# Patient Record
Sex: FEMALE | Race: WHITE | NOT HISPANIC OR LATINO | Employment: OTHER | URBAN - METROPOLITAN AREA
[De-identification: names, ages, dates, MRNs, and addresses within clinical notes are randomized per-mention and may not be internally consistent; named-entity substitution may affect disease eponyms.]

---

## 2019-12-01 ENCOUNTER — OFFICE VISIT (OUTPATIENT)
Dept: URGENT CARE | Facility: HOSPITAL | Age: 79
End: 2019-12-01
Payer: MEDICARE

## 2019-12-01 ENCOUNTER — APPOINTMENT (OUTPATIENT)
Dept: RADIOLOGY | Facility: HOSPITAL | Age: 79
End: 2019-12-01
Payer: MEDICARE

## 2019-12-01 VITALS
SYSTOLIC BLOOD PRESSURE: 109 MMHG | TEMPERATURE: 99.4 F | RESPIRATION RATE: 16 BRPM | BODY MASS INDEX: 27.32 KG/M2 | WEIGHT: 170 LBS | OXYGEN SATURATION: 96 % | HEIGHT: 66 IN | HEART RATE: 72 BPM | DIASTOLIC BLOOD PRESSURE: 68 MMHG

## 2019-12-01 DIAGNOSIS — M54.6 ACUTE LEFT-SIDED THORACIC BACK PAIN: ICD-10-CM

## 2019-12-01 DIAGNOSIS — M54.6 ACUTE LEFT-SIDED THORACIC BACK PAIN: Primary | ICD-10-CM

## 2019-12-01 PROCEDURE — 99203 OFFICE O/P NEW LOW 30 MIN: CPT | Performed by: NURSE PRACTITIONER

## 2019-12-01 PROCEDURE — G0463 HOSPITAL OUTPT CLINIC VISIT: HCPCS | Performed by: NURSE PRACTITIONER

## 2019-12-01 PROCEDURE — 71046 X-RAY EXAM CHEST 2 VIEWS: CPT

## 2019-12-01 RX ORDER — ASPIRIN 81 MG/1
81 TABLET, CHEWABLE ORAL DAILY
COMMUNITY

## 2019-12-01 RX ORDER — OXYBUTYNIN CHLORIDE 10 MG/1
10 TABLET, EXTENDED RELEASE ORAL
COMMUNITY
End: 2020-04-09 | Stop reason: SDUPTHER

## 2019-12-01 NOTE — PATIENT INSTRUCTIONS
Can take Tylenol as needed for pain  If he develops any increased pain, shortness of breath, difficulty breathing, chest pain, palpitations, cough, coughing up blood, increased swelling in legs, fever, or any new or concerning symptoms please return or proceed ER  Recommend following up with PCP in 2-3 days  Back Pain   WHAT YOU NEED TO KNOW:   Back pain is common  It can be caused by many conditions, such as arthritis or the breakdown of spinal discs  Your risk for back pain is increased by injuries, lack of activity, or repeated bending and twisting  You may feel sore or stiff on one or both sides of your back  The pain may spread to your buttocks or thighs  DISCHARGE INSTRUCTIONS:   Medicines:   · NSAIDs  help decrease swelling and pain  This medicine is available with or without a doctor's order  NSAIDs can cause stomach bleeding or kidney problems in certain people  If you take blood thinner medicine, always ask your healthcare provider if NSAIDs are safe for you  Always read the medicine label and follow directions  · Acetaminophen  decreases pain  It is available without a doctor's order  Ask how much to take and how often to take it  Follow directions  Acetaminophen can cause liver damage if not taken correctly  · Prescription pain medicine  may be given  Ask your healthcare provider how to take this medicine safely  · Take your medicine as directed  Contact your healthcare provider if you think your medicine is not helping or if you have side effects  Tell him or her if you are allergic to any medicine  Keep a list of the medicines, vitamins, and herbs you take  Include the amounts, and when and why you take them  Bring the list or the pill bottles to follow-up visits  Carry your medicine list with you in case of an emergency  Follow up with your healthcare provider in 2 weeks, or as directed:  Write down your questions so you remember to ask them during your visits    How to manage your back pain:   · Apply ice  on your back or affected area for 15 to 20 minutes every hour or as directed  Use an ice pack, or put crushed ice in a plastic bag  Cover it with a towel  Ice helps prevent tissue damage and decreases pain  · Apply heat  on your back or affected area for 20 to 30 minutes every 2 hours for as many days as directed  Heat helps decrease pain and muscle spasms  · Stay active  as much as you can without causing more pain  Bed rest could make your back pain worse  Avoid heavy lifting until your pain is gone  Return to the emergency department if:   · You have pain, numbness, or weakness in one or both legs  · Your pain becomes so severe that you cannot walk  · You cannot control your urine or bowel movements  · You have severe back pain with chest pain  · You have severe back pain, nausea, and vomiting  · You have severe back pain that spreads to your side or genital area  Contact your healthcare provider if:   · You have back pain that does not get better with rest and pain medicine  · You have a fever  · You have pain that worsens when you are on your back or when you rest     · You have pain that worsens when you cough or sneeze  · You lose weight without trying  · You have questions or concerns about your condition or care  © 2017 [address] Information is for End User's use only and may not be sold, redistributed or otherwise used for commercial purposes  All illustrations and images included in CareNotes® are the copyrighted property of [employer]  or Andrea Singh  The above information is an  only  It is not intended as medical advice for individual conditions or treatments  Talk to your doctor, nurse or pharmacist before following any medical regimen to see if it is safe and effective for you

## 2019-12-01 NOTE — PROGRESS NOTES
[facility]        NAME: Kory Patel is a 67 y o  female  : 1940    MRN: [id number]  DATE: 2019  TIME: 2:03 PM    Assessment and Plan   Acute left-sided thoracic back pain [M54 6]  1  Acute left-sided thoracic back pain  XR chest pa & lateral    CANCELED: POCT urine dip     No acute cardiopulmonary disease on x-ray per my read  Patient's daughter is requesting urine sample, however patient missed the hat and  was unable to provide additional sample  Patient is currently asymptomatic, advised the patient and daughter if she develops any flank pain, abdominal pain, vomiting, urinary symptoms, fever, confusion, any concerning symptoms to please return or proceed ER  Verbalizes understanding  Patient Instructions     Patient Instructions   Can take Tylenol as needed for pain  If you develop any increased pain, shortness of breath, difficulty breathing, chest pain, palpitations, cough, coughing up blood, increased swelling in legs, fever, or any new or concerning symptoms please return or proceed ER  Recommend following up with PCP in 2-3 days  Back Pain   WHAT YOU NEED TO KNOW:   Back pain is common  It can be caused by many conditions, such as arthritis or the breakdown of spinal discs  Your risk for back pain is increased by injuries, lack of activity, or repeated bending and twisting  You may feel sore or stiff on one or both sides of your back  The pain may spread to your buttocks or thighs  DISCHARGE INSTRUCTIONS:   Medicines:   · NSAIDs  help decrease swelling and pain  This medicine is available with or without a doctor's order  NSAIDs can cause stomach bleeding or kidney problems in certain people  If you take blood thinner medicine, always ask your healthcare provider if NSAIDs are safe for you  Always read the medicine label and follow directions  · Acetaminophen  decreases pain  It is available without a doctor's order   Ask how much to take and how often to take it  Follow directions  Acetaminophen can cause liver damage if not taken correctly  · Prescription pain medicine  may be given  Ask your healthcare provider how to take this medicine safely  · Take your medicine as directed  Contact your healthcare provider if you think your medicine is not helping or if you have side effects  Tell him or her if you are allergic to any medicine  Keep a list of the medicines, vitamins, and herbs you take  Include the amounts, and when and why you take them  Bring the list or the pill bottles to follow-up visits  Carry your medicine list with you in case of an emergency  Follow up with your healthcare provider in 2 weeks, or as directed:  Write down your questions so you remember to ask them during your visits  How to manage your back pain:   · Apply ice  on your back or affected area for 15 to 20 minutes every hour or as directed  Use an ice pack, or put crushed ice in a plastic bag  Cover it with a towel  Ice helps prevent tissue damage and decreases pain  · Apply heat  on your back or affected area for 20 to 30 minutes every 2 hours for as many days as directed  Heat helps decrease pain and muscle spasms  · Stay active  as much as you can without causing more pain  Bed rest could make your back pain worse  Avoid heavy lifting until your pain is gone  Return to the emergency department if:   · You have pain, numbness, or weakness in one or both legs  · Your pain becomes so severe that you cannot walk  · You cannot control your urine or bowel movements  · You have severe back pain with chest pain  · You have severe back pain, nausea, and vomiting  · You have severe back pain that spreads to your side or genital area  Contact your healthcare provider if:   · You have back pain that does not get better with rest and pain medicine  · You have a fever      · You have pain that worsens when you are on your back or when you rest     · You have pain that worsens when you cough or sneeze  · You lose weight without trying  · You have questions or concerns about your condition or care  © 2017 [address]  Information is for End User's use only and may not be sold, redistributed or otherwise used for commercial purposes  All illustrations and images included in CareNotes® are the copyrighted property of [employer]  or Andrea Singh  The above information is an  only  It is not intended as medical advice for individual conditions or treatments  Talk to your doctor, nurse or pharmacist before following any medical regimen to see if it is safe and effective for you  Follow up with PCP in 3-5 days  Proceed to  ER if symptoms worsen  Chief Complaint     Chief Complaint   Patient presents with    Back Pain     Mid to lower on the left side, started about three days ago  Urinating normally  History of Present Illness       Patient is a 73-year-old female who presents with a 3 day history of left sided thoracic back pain  Patient states the pain is only present while lying flat at night  Patient is currently asymptomatic  Patient denies any chest pain, shortness of breath, palpitations, hemoptysis or cough  Denies any pain with breathing  Denies any pain with movement or currently at rest   Patient denies any increased leg swelling  Patient denies any history of PE or DVT  Patient denies any urinary complaints, flank pain, abdominal pain, nausea, vomiting or diarrhea  Patient denies any fever, chills, or body aches  Patient denies any URI symptoms  Patient is currently wheelchair-bound but denies any increased weakness  Patient's daughter denies any confusion or change in mental status  Denies any headache, dizziness or feeling lightheaded  Denies any numbness, tingling, decreased sensation in extremities  Has not tried any over-the-counter medication         Review of Systems   Review of Systems   Constitutional: Negative for chills, diaphoresis, fatigue and fever  HENT: Negative for congestion, ear pain, facial swelling, mouth sores, postnasal drip, rhinorrhea, sinus pressure, sinus pain, sore throat and trouble swallowing  Eyes: Negative for photophobia, pain, discharge, redness, itching and visual disturbance  Respiratory: Negative for cough, chest tightness, shortness of breath, wheezing and stridor  Cardiovascular: Negative for chest pain, palpitations and leg swelling  Gastrointestinal: Negative for abdominal pain, constipation, diarrhea, nausea and vomiting  Genitourinary: Negative for decreased urine volume, difficulty urinating, dysuria, flank pain, frequency, hematuria, pelvic pain, urgency, vaginal bleeding and vaginal discharge  Musculoskeletal: Positive for back pain  Negative for arthralgias, joint swelling, myalgias, neck pain and neck stiffness  Skin: Negative for rash  Neurological: Negative for dizziness, tremors, seizures, syncope, facial asymmetry, speech difficulty, weakness, light-headedness, numbness and headaches           Current Medications       Current Outpatient Medications:     aspirin 81 mg chewable tablet, Chew 81 mg daily, Disp: , Rfl:     oxybutynin (DITROPAN-XL) 10 MG 24 hr tablet, Take 10 mg by mouth daily at bedtime, Disp: , Rfl:     Current Allergies     Allergies as of 12/01/2019 - Reviewed 12/01/2019   Allergen Reaction Noted    Novocain [procaine]  12/01/2019    Penicillins  12/01/2019    Sulfa antibiotics  12/01/2019            The following portions of the patient's history were reviewed and updated as appropriate: allergies, current medications, past family history, past medical history, past social history, past surgical history and problem list      Past Medical History:   Diagnosis Date    Supranuclear palsy ([address] )        Past Surgical History:   Procedure Laterality Date    BACK SURGERY      HIP FRACTURE SURGERY         No family history on file  Medications have been verified  Objective   /68 (BP Location: Left arm, Patient Position: Sitting, Cuff Size: Standard)   Pulse 72   Temp 99 4 °F (37 4 °C) (Temporal)   Resp 16   Ht 5' 5 5" (1 664 m)   Wt 77 1 kg (170 lb)   SpO2 96%   BMI 27 86 kg/m²        Physical Exam     Physical Exam   Constitutional: She is oriented to person, place, and time  She appears well-developed and well-nourished  No distress  HENT:   Head: Normocephalic and atraumatic  Neck: Normal range of motion  Neck supple  No spinous process tenderness and no muscular tenderness present  Normal range of motion present  Cardiovascular: Normal rate, regular rhythm, S1 normal, S2 normal, normal heart sounds, intact distal pulses and normal pulses  +1 pitting edema in BLE, chronic per patient and daughter  Pulmonary/Chest: Effort normal and breath sounds normal  No stridor  She has no decreased breath sounds  She exhibits no mass, no tenderness, no bony tenderness, no crepitus, no edema, no deformity and no retraction  Abdominal: Soft  Normal appearance and bowel sounds are normal  She exhibits no distension and no mass  There is no tenderness  There is no rigidity, no rebound, no guarding, no CVA tenderness, no tenderness at McBurney's point and negative Azar's sign  Musculoskeletal:        Cervical back: Normal         Thoracic back: Normal  She exhibits no tenderness, no bony tenderness, no swelling, no edema, no deformity, no pain and no spasm  Lumbar back: Normal    Neurological: She is alert and oriented to person, place, and time  GCS eye subscore is 4  GCS verbal subscore is 5  GCS motor subscore is 6  Skin: Skin is warm and dry  Capillary refill takes less than 2 seconds  She is not diaphoretic

## 2019-12-16 ENCOUNTER — OFFICE VISIT (OUTPATIENT)
Dept: FAMILY MEDICINE CLINIC | Facility: CLINIC | Age: 79
End: 2019-12-16
Payer: MEDICARE

## 2019-12-16 VITALS
HEART RATE: 74 BPM | BODY MASS INDEX: 27.97 KG/M2 | DIASTOLIC BLOOD PRESSURE: 66 MMHG | WEIGHT: 174 LBS | SYSTOLIC BLOOD PRESSURE: 112 MMHG | TEMPERATURE: 98.1 F | HEIGHT: 66 IN | OXYGEN SATURATION: 98 % | RESPIRATION RATE: 18 BRPM

## 2019-12-16 DIAGNOSIS — Z11.1 VISIT FOR TB SKIN TEST: ICD-10-CM

## 2019-12-16 DIAGNOSIS — Z00.00 ENCOUNTER FOR MEDICARE ANNUAL WELLNESS EXAM: ICD-10-CM

## 2019-12-16 DIAGNOSIS — I63.89 CEREBROVASCULAR ACCIDENT (CVA) DUE TO OTHER MECHANISM (HCC): ICD-10-CM

## 2019-12-16 DIAGNOSIS — Z13.220 SCREENING CHOLESTEROL LEVEL: ICD-10-CM

## 2019-12-16 DIAGNOSIS — G23.1 SUPRANUCLEAR PALSY (HCC): Primary | ICD-10-CM

## 2019-12-16 PROBLEM — I63.9 CEREBROVASCULAR ACCIDENT (HCC): Status: ACTIVE | Noted: 2019-12-16

## 2019-12-16 PROCEDURE — G0439 PPPS, SUBSEQ VISIT: HCPCS | Performed by: NURSE PRACTITIONER

## 2019-12-16 PROCEDURE — 99214 OFFICE O/P EST MOD 30 MIN: CPT | Performed by: NURSE PRACTITIONER

## 2019-12-16 NOTE — PROGRESS NOTES
[facility]        NAME: David Mistry is a 67 y o  female  : 1940    MRN: [id number]  DATE: 2019  TIME: 4:43 PM    Assessment and Plan   Supranuclear palsy ([address] ) [G23 1]  1  Supranuclear palsy ([address] )  Comprehensive metabolic panel   2  Visit for TB skin test  Quantiferon TB Gold Plus   3  Cerebrovascular accident (CVA) due to other mechanism [facility])  Comprehensive metabolic panel   4  Screening cholesterol level  Lipid panel   5  Encounter for Medicare annual wellness exam           Patient Instructions     Patient Instructions     Recommend OT/PT consult  Highly encourage another neurology consult for decreasing neurological functionability  Form filled out for Adult Day services  Labs ordered  Call or return for problems/concerns      Medicare Preventive Visit Patient Instructions  Thank you for completing your Welcome to Medicare Visit or Medicare Annual Wellness Visit today  Your next wellness visit will be due in one year (2020)  The screening/preventive services that you may require over the next 5-10 years are detailed below  Some tests may not apply to you based off risk factors and/or age  Screening tests ordered at today's visit but not completed yet may show as past due  Also, please note that scanned in results may not display below  Preventive Screenings:  Service Recommendations Previous Testing/Comments   Colorectal Cancer Screening  * Colonoscopy    * Fecal Occult Blood Test (FOBT)/Fecal Immunochemical Test (FIT)  * Fecal DNA/Cologuard Test  * Flexible Sigmoidoscopy Age: 54-65 years old   Colonoscopy: every 10 years (may be performed more frequently if at higher risk)  OR  FOBT/FIT: every 1 year  OR  Cologuard: every 3 years  OR  Sigmoidoscopy: every 5 years  Screening may be recommended earlier than age 48 if at higher risk for colorectal cancer   Also, an individualized decision between you and your healthcare provider will decide whether screening between the ages of 74-80 would be appropriate  Colonoscopy: Not on file  FOBT/FIT: Not on file  Cologuard: Not on file  Sigmoidoscopy: Not on file         Breast Cancer Screening Age: 36 years old  Frequency: every 1-2 years  Not required if history of left and right mastectomy Mammogram: Not on file       Cervical Cancer Screening Between the ages of 21-29, pap smear recommended once every 3 years  Between the ages of 33-67, can perform pap smear with HPV co-testing every 5 years  Recommendations may differ for women with a history of total hysterectomy, cervical cancer, or abnormal pap smears in past  Pap Smear: Not on file    Screening Not Indicated   Hepatitis C Screening Once for adults born between 1945 and 1965  More frequently in patients at high risk for Hepatitis C Hep C Antibody: Not on file       Diabetes Screening 1-2 times per year if you're at risk for diabetes or have pre-diabetes Fasting glucose: No results in last 5 years   A1C: No results in last 5 years       Cholesterol Screening Once every 5 years if you don't have a lipid disorder  May order more often based on risk factors  Lipid panel: Not on file         Other Preventive Screenings Covered by Medicare:  1  Abdominal Aortic Aneurysm (AAA) Screening: covered once if your at risk  You're considered to be at risk if you have a family history of AAA  2  Lung Cancer Screening: covers low dose CT scan once per year if you meet all of the following conditions: (1) Age 50-69; (2) No signs or symptoms of lung cancer; (3) Current smoker or have quit smoking within the last 15 years; (4) You have a tobacco smoking history of at least 30 pack years (packs per day multiplied by number of years you smoked); (5) You get a written order from a healthcare provider    3  Glaucoma Screening: covered annually if you're considered high risk: (1) You have diabetes OR (2) Family history of glaucoma OR (3)  aged 48 and older OR (4)  American aged 72 and older  3  Osteoporosis Screening: covered every 2 years if you meet one of the following conditions: (1) You're estrogen deficient and at risk for osteoporosis based off medical history and other findings; (2) Have a vertebral abnormality; (3) On glucocorticoid therapy for more than 3 months; (4) Have primary hyperparathyroidism; (5) On osteoporosis medications and need to assess response to drug therapy  · Last bone density test (DXA Scan): Not on file  5  HIV Screening: covered annually if you're between the age of 12-76  Also covered annually if you are younger than 13 and older than 72 with risk factors for HIV infection  For pregnant patients, it is covered up to 3 times per pregnancy  Immunizations:  Immunization Recommendations   Influenza Vaccine Annual influenza vaccination during flu season is recommended for all persons aged >= 6 months who do not have contraindications   Pneumococcal Vaccine (Prevnar and Pneumovax)  * Prevnar = PCV13  * Pneumovax = PPSV23   Adults 25-60 years old: 1-3 doses may be recommended based on certain risk factors  Adults 72 years old: Prevnar (PCV13) vaccine recommended followed by Pneumovax (PPSV23) vaccine  If already received PPSV23 since turning 65, then PCV13 recommended at least one year after PPSV23 dose  Hepatitis B Vaccine 3 dose series if at intermediate or high risk (ex: diabetes, end stage renal disease, liver disease)   Tetanus (Td) Vaccine - COST NOT COVERED BY MEDICARE PART B Following completion of primary series, a booster dose should be given every 10 years to maintain immunity against tetanus  Td may also be given as tetanus wound prophylaxis  Tdap Vaccine - COST NOT COVERED BY MEDICARE PART B Recommended at least once for all adults  For pregnant patients, recommended with each pregnancy     Shingles Vaccine (Shingrix) - COST NOT COVERED BY MEDICARE PART B  2 shot series recommended in those aged 48 and above     Health Maintenance Due:  There are no preventive care reminders to display for this patient  Immunizations Due:      Topic Date Due    DTaP,Tdap,and Td Vaccines (1 - Tdap) 07/12/1951    Influenza Vaccine  07/01/2019     Advance Directives   What are advance directives? Advance directives are legal documents that state your wishes and plans for medical care  These plans are made ahead of time in case you lose your ability to make decisions for yourself  Advance directives can apply to any medical decision, such as the treatments you want, and if you want to donate organs  What are the types of advance directives? There are many types of advance directives, and each state has rules about how to use them  You may choose a combination of any of the following:  · Living will: This is a written record of the treatment you want  You can also choose which treatments you do not want, which to limit, and which to stop at a certain time  This includes surgery, medicine, IV fluid, and tube feedings  · Durable power of  for healthcare [facility]): This is a written record that states who you want to make healthcare choices for you when you are unable to make them for yourself  This person, called a proxy, is usually a family member or a friend  You may choose more than 1 proxy  · Do not resuscitate (DNR) order:  A DNR order is used in case your heart stops beating or you stop breathing  It is a request not to have certain forms of treatment, such as CPR  A DNR order may be included in other types of advance directives  · Medical directive: This covers the care that you want if you are in a coma, near death, or unable to make decisions for yourself  You can list the treatments you want for each condition  Treatment may include pain medicine, surgery, blood transfusions, dialysis, IV or tube feedings, and a ventilator (breathing machine)  · Values history:   This document has questions about your views, beliefs, and how you feel and think about life  This information can help others choose the care that you would choose  Why are advance directives important? An advance directive helps you control your care  Although spoken wishes may be used, it is better to have your wishes written down  Spoken wishes can be misunderstood, or not followed  Treatments may be given even if you do not want them  An advance directive may make it easier for your family to make difficult choices about your care  Fall Prevention    Fall prevention  includes ways to make your home and other areas safer  It also includes ways you can move more carefully to prevent a fall  Health conditions that cause changes in your blood pressure, vision, or muscle strength and coordination may increase your risk for falls  Medicines may also increase your risk for falls if they make you dizzy, weak, or sleepy  Fall prevention tips:   · Stand or sit up slowly  · Use assistive devices as directed  · Wear shoes that fit well and have soles that   · Wear a personal alarm  · Stay active  · Manage your medical conditions  Home Safety Tips:  · Add items to prevent falls in the bathroom  · Keep paths clear  · Install bright lights in your home  · Keep items you use often on shelves within reach  · Paint or place reflective tape on the edges of your stairs  Urinary Incontinence   Urinary incontinence (UI)  is when you lose control of your bladder  UI develops because your bladder cannot store or empty urine properly  The 3 most common types of UI are stress incontinence, urge incontinence, or both  Medicines:   · May be given to help strengthen your bladder control  Report any side effects of medication to your healthcare provider  Do pelvic muscle exercises often:  Your pelvic muscles help you stop urinating  Squeeze these muscles tight for 5 seconds, then relax for 5 seconds  Gradually work up to squeezing for 10 seconds   Do 3 sets of 15 repetitions a day, or as directed  This will help strengthen your pelvic muscles and improve bladder control  Train your bladder:  Go to the bathroom at set times, such as every 2 hours, even if you do not feel the urge to go  You can also try to hold your urine when you feel the urge to go  For example, hold your urine for 5 minutes when you feel the urge to go  As that becomes easier, hold your urine for 10 minutes  Self-care:   · Keep a UI record  Write down how often you leak urine and how much you leak  Make a note of what you were doing when you leaked urine  · Drink liquids as directed  You may need to limit the amount of liquid you drink to help control your urine leakage  Do not drink any liquid right before you go to bed  Limit or do not have drinks that contain caffeine or alcohol  · Prevent constipation  Eat a variety of high-fiber foods  Good examples are high-fiber cereals, beans, vegetables, and whole-grain breads  Walking is the best way to trigger your intestines to have a bowel movement  · Exercise regularly and maintain a healthy weight  Weight loss and exercise will decrease pressure on your bladder and help you control your leakage  · Use a catheter as directed  to help empty your bladder  A catheter is a tiny, plastic tube that is put into your bladder to drain your urine  · Go to behavior therapy as directed  Behavior therapy may be used to help you learn to control your urge to urinate  Weight Management   Why it is important to manage your weight:  Being overweight increases your risk of health conditions such as heart disease, high blood pressure, type 2 diabetes, and certain types of cancer  It can also increase your risk for osteoarthritis, sleep apnea, and other respiratory problems  Aim for a slow, steady weight loss  Even a small amount of weight loss can lower your risk of health problems    How to lose weight safely:  A safe and healthy way to lose weight is to eat fewer calories and get regular exercise  You can lose up about 1 pound a week by decreasing the number of calories you eat by 500 calories each day  Healthy meal plan for weight management:  A healthy meal plan includes a variety of foods, contains fewer calories, and helps you stay healthy  A healthy meal plan includes the following:  · Eat whole-grain foods more often  A healthy meal plan should contain fiber  Fiber is the part of grains, fruits, and vegetables that is not broken down by your body  Whole-grain foods are healthy and provide extra fiber in your diet  Some examples of whole-grain foods are whole-wheat breads and pastas, oatmeal, brown rice, and bulgur  · Eat a variety of vegetables every day  Include dark, leafy greens such as spinach, kale, elizabeth greens, and mustard greens  Eat yellow and orange vegetables such as carrots, sweet potatoes, and winter squash  · Eat a variety of fruits every day  Choose fresh or canned fruit (canned in its own juice or light syrup) instead of juice  Fruit juice has very little or no fiber  · Eat low-fat dairy foods  Drink fat-free (skim) milk or 1% milk  Eat fat-free yogurt and low-fat cottage cheese  Try low-fat cheeses such as mozzarella and other reduced-fat cheeses  · Choose meat and other protein foods that are low in fat  Choose beans or other legumes such as split peas or lentils  Choose fish, skinless poultry (chicken or turkey), or lean cuts of red meat (beef or pork)  Before you cook meat or poultry, cut off any visible fat  · Use less fat and oil  Try baking foods instead of frying them  Add less fat, such as margarine, sour cream, regular salad dressing and mayonnaise to foods  Eat fewer high-fat foods  Some examples of high-fat foods include french fries, doughnuts, ice cream, and cakes  · Eat fewer sweets  Limit foods and drinks that are high in sugar  This includes candy, cookies, regular soda, and sweetened drinks    Exercise: Exercise at least 30 minutes per day on most days of the week  Some examples of exercise include walking, biking, dancing, and swimming  You can also fit in more physical activity by taking the stairs instead of the elevator or parking farther away from stores  Ask your healthcare provider about the best exercise plan for you  © Copyright [employer] 2018 Information is for End User's use only and may not be sold, redistributed or otherwise used for commercial purposes  All illustrations and images included in CareNotes® are the copyrighted property of [employer]  or [address] [place] [address]            Chief Complaint     Chief Complaint   Patient presents with   [address] Medicare Wellness Visit         History of Present Illness       Pt  Presents to establish care- her 2 daughters take care of her in their homes- is 100% total care- is wheelchair bound- is losing muscle and strength in her neck and back  Was diagnosed with a "an old CVA" and supranuclear palsy by a neurologist- she has paperwork today at visit to be accepted into the [facility] Day program in [employer]  Pt  Is incontinent and has trouble feeding self  She is alert and oriented x 3 at times but then disoriented  Has some memory loss but does remember dates when asked  Daughter Brandi Osborne) is requesting that a  call her directly to find out what in home services would be covered (physical/occupational therapies)  Review of Systems   Review of Systems   Constitutional: Positive for activity change  Negative for appetite change, chills, diaphoresis and fatigue  HENT: Negative for congestion, rhinorrhea, sinus pressure, sinus pain, sore throat and trouble swallowing  Respiratory: Negative for cough, choking and wheezing  Cardiovascular: Positive for leg swelling (left leg has always had worse swelling than the right leg per daughter, )  Negative for chest pain     Gastrointestinal: Positive for constipation (daughter has been giving prunes)  Negative for abdominal distention  Genitourinary: Positive for difficulty urinating (incontinent)  Negative for decreased urine volume, dysuria, flank pain, hematuria and urgency  Musculoskeletal: Positive for gait problem and neck pain (neck "hangs" on chests per daughter)  Negative for neck stiffness  Skin: Negative for color change, rash and wound  Neurological: Positive for speech difficulty and weakness  Negative for dizziness (described as "off balance"), seizures and headaches  Psychiatric/Behavioral: Positive for confusion  Negative for agitation, behavioral problems, decreased concentration, dysphoric mood, hallucinations, self-injury, sleep disturbance and suicidal ideas  The patient is not nervous/anxious and is not hyperactive  Current Medications       Current Outpatient Medications:     aspirin 81 mg chewable tablet, Chew 81 mg daily, Disp: , Rfl:     oxybutynin (DITROPAN-XL) 10 MG 24 hr tablet, Take 10 mg by mouth daily at bedtime, Disp: , Rfl:     Current Allergies     Allergies as of 12/16/2019 - Reviewed 12/16/2019   Allergen Reaction Noted    Procaine Anaphylaxis and Other (See Comments) 10/19/2011    Penicillins Other (See Comments) 11/02/2007    Sulfa antibiotics  11/07/2016    Sulfacetamide  11/02/2007            The following portions of the patient's history were reviewed and updated as appropriate: allergies, current medications, past family history, past medical history, past social history, past surgical history and problem list      Past Medical History:   Diagnosis Date    Supranuclear palsy ([address] )        Past Surgical History:   Procedure Laterality Date    BACK SURGERY      HIP FRACTURE SURGERY         History reviewed  No pertinent family history  Medications have been verified          Objective   /66   Pulse 74   Temp 98 1 °F (36 7 °C)   Resp 18   Ht 5' 5 5" (1 664 m)   Wt 78 9 kg (174 lb)   SpO2 98% BMI 28 51 kg/m²        Physical Exam     Physical Exam   Constitutional: She appears well-developed and well-nourished  No distress  HENT:   Head: Normocephalic and atraumatic  Right Ear: Tympanic membrane, external ear and ear canal normal    Left Ear: Tympanic membrane, external ear and ear canal normal    Nose: Nose normal    Mouth/Throat: Uvula is midline, oropharynx is clear and moist and mucous membranes are normal    Eyes: Pupils are equal, round, and reactive to light  Conjunctivae and EOM are normal  Right eye exhibits no discharge  Left eye exhibits no discharge  Neck: Normal range of motion  Cardiovascular: Normal rate, regular rhythm and normal heart sounds  Exam reveals no gallop and no friction rub  No murmur heard  Pulmonary/Chest: Effort normal and breath sounds normal  No respiratory distress  She has no wheezes  She has no rales  Abdominal: Soft  Bowel sounds are normal  She exhibits no distension and no mass  There is no tenderness  Musculoskeletal: Normal range of motion  She exhibits no edema, tenderness or deformity  Neurological: She is alert  She is disoriented  She displays atrophy (weakness noted in LUE but had some strength, less than RUE)  No cranial nerve deficit  Coordination normal    Pt  Allowed head to hang onto chest- would lift it when asked  Eyes were slow to follow objects and focus  Speech slurred  Right hand appeared flaccid and contracted until asked pt  Was able to shake my hand  Skin: Skin is warm and dry  No rash noted  She is not diaphoretic  No erythema  Psychiatric: Her behavior is normal  Judgment and thought content normal  Her mood appears not anxious  Her speech is slurred  Cognition and memory are impaired  She exhibits a depressed mood (flat affect, little emotion shown)  Nursing note and vitals reviewed

## 2019-12-16 NOTE — PATIENT INSTRUCTIONS
Recommend OT/PT consult  Highly encourage another neurology consult for decreasing neurological functionability  Form filled out for Adult Day services  Labs ordered  Call or return for problems/concerns      Medicare Preventive Visit Patient Instructions  Thank you for completing your Welcome to Medicare Visit or Medicare Annual Wellness Visit today  Your next wellness visit will be due in one year (12/16/2020)  The screening/preventive services that you may require over the next 5-10 years are detailed below  Some tests may not apply to you based off risk factors and/or age  Screening tests ordered at today's visit but not completed yet may show as past due  Also, please note that scanned in results may not display below  Preventive Screenings:  Service Recommendations Previous Testing/Comments   Colorectal Cancer Screening  * Colonoscopy    * Fecal Occult Blood Test (FOBT)/Fecal Immunochemical Test (FIT)  * Fecal DNA/Cologuard Test  * Flexible Sigmoidoscopy Age: 54-65 years old   Colonoscopy: every 10 years (may be performed more frequently if at higher risk)  OR  FOBT/FIT: every 1 year  OR  Cologuard: every 3 years  OR  Sigmoidoscopy: every 5 years  Screening may be recommended earlier than age 48 if at higher risk for colorectal cancer  Also, an individualized decision between you and your healthcare provider will decide whether screening between the ages of 74-80 would be appropriate  Colonoscopy: Not on file  FOBT/FIT: Not on file  Cologuard: Not on file  Sigmoidoscopy: Not on file         Breast Cancer Screening Age: 36 years old  Frequency: every 1-2 years  Not required if history of left and right mastectomy Mammogram: Not on file       Cervical Cancer Screening Between the ages of 21-29, pap smear recommended once every 3 years  Between the ages of 33-67, can perform pap smear with HPV co-testing every 5 years     Recommendations may differ for women with a history of total hysterectomy, cervical cancer, or abnormal pap smears in past  Pap Smear: Not on file    Screening Not Indicated   Hepatitis C Screening Once for adults born between 1945 and 1965  More frequently in patients at high risk for Hepatitis C Hep C Antibody: Not on file       Diabetes Screening 1-2 times per year if you're at risk for diabetes or have pre-diabetes Fasting glucose: No results in last 5 years   A1C: No results in last 5 years       Cholesterol Screening Once every 5 years if you don't have a lipid disorder  May order more often based on risk factors  Lipid panel: Not on file         Other Preventive Screenings Covered by Medicare:  1  Abdominal Aortic Aneurysm (AAA) Screening: covered once if your at risk  You're considered to be at risk if you have a family history of AAA  2  Lung Cancer Screening: covers low dose CT scan once per year if you meet all of the following conditions: (1) Age 50-69; (2) No signs or symptoms of lung cancer; (3) Current smoker or have quit smoking within the last 15 years; (4) You have a tobacco smoking history of at least 30 pack years (packs per day multiplied by number of years you smoked); (5) You get a written order from a healthcare provider  3  Glaucoma Screening: covered annually if you're considered high risk: (1) You have diabetes OR (2) Family history of glaucoma OR (3)  aged 48 and older OR (3)  American aged 72 and older  3  Osteoporosis Screening: covered every 2 years if you meet one of the following conditions: (1) You're estrogen deficient and at risk for osteoporosis based off medical history and other findings; (2) Have a vertebral abnormality; (3) On glucocorticoid therapy for more than 3 months; (4) Have primary hyperparathyroidism; (5) On osteoporosis medications and need to assess response to drug therapy  · Last bone density test (DXA Scan): Not on file  5  HIV Screening: covered annually if you're between the age of 12-76   Also covered annually if you are younger than 13 and older than 72 with risk factors for HIV infection  For pregnant patients, it is covered up to 3 times per pregnancy  Immunizations:  Immunization Recommendations   Influenza Vaccine Annual influenza vaccination during flu season is recommended for all persons aged >= 6 months who do not have contraindications   Pneumococcal Vaccine (Prevnar and Pneumovax)  * Prevnar = PCV13  * Pneumovax = PPSV23   Adults 25-60 years old: 1-3 doses may be recommended based on certain risk factors  Adults 72 years old: Prevnar (PCV13) vaccine recommended followed by Pneumovax (PPSV23) vaccine  If already received PPSV23 since turning 65, then PCV13 recommended at least one year after PPSV23 dose  Hepatitis B Vaccine 3 dose series if at intermediate or high risk (ex: diabetes, end stage renal disease, liver disease)   Tetanus (Td) Vaccine - COST NOT COVERED BY MEDICARE PART B Following completion of primary series, a booster dose should be given every 10 years to maintain immunity against tetanus  Td may also be given as tetanus wound prophylaxis  Tdap Vaccine - COST NOT COVERED BY MEDICARE PART B Recommended at least once for all adults  For pregnant patients, recommended with each pregnancy  Shingles Vaccine (Shingrix) - COST NOT COVERED BY MEDICARE PART B  2 shot series recommended in those aged 48 and above     Health Maintenance Due:  There are no preventive care reminders to display for this patient  Immunizations Due:      Topic Date Due    DTaP,Tdap,and Td Vaccines (1 - Tdap) 07/12/1951    Influenza Vaccine  07/01/2019     Advance Directives   What are advance directives? Advance directives are legal documents that state your wishes and plans for medical care  These plans are made ahead of time in case you lose your ability to make decisions for yourself  Advance directives can apply to any medical decision, such as the treatments you want, and if you want to donate organs     What are the types of advance directives? There are many types of advance directives, and each state has rules about how to use them  You may choose a combination of any of the following:  · Living will: This is a written record of the treatment you want  You can also choose which treatments you do not want, which to limit, and which to stop at a certain time  This includes surgery, medicine, IV fluid, and tube feedings  · Durable power of  for healthcare [facility]): This is a written record that states who you want to make healthcare choices for you when you are unable to make them for yourself  This person, called a proxy, is usually a family member or a friend  You may choose more than 1 proxy  · Do not resuscitate (DNR) order:  A DNR order is used in case your heart stops beating or you stop breathing  It is a request not to have certain forms of treatment, such as CPR  A DNR order may be included in other types of advance directives  · Medical directive: This covers the care that you want if you are in a coma, near death, or unable to make decisions for yourself  You can list the treatments you want for each condition  Treatment may include pain medicine, surgery, blood transfusions, dialysis, IV or tube feedings, and a ventilator (breathing machine)  · Values history: This document has questions about your views, beliefs, and how you feel and think about life  This information can help others choose the care that you would choose  Why are advance directives important? An advance directive helps you control your care  Although spoken wishes may be used, it is better to have your wishes written down  Spoken wishes can be misunderstood, or not followed  Treatments may be given even if you do not want them  An advance directive may make it easier for your family to make difficult choices about your care  Fall Prevention    Fall prevention  includes ways to make your home and other areas safer   It also includes ways you can move more carefully to prevent a fall  Health conditions that cause changes in your blood pressure, vision, or muscle strength and coordination may increase your risk for falls  Medicines may also increase your risk for falls if they make you dizzy, weak, or sleepy  Fall prevention tips:   · Stand or sit up slowly  · Use assistive devices as directed  · Wear shoes that fit well and have soles that   · Wear a personal alarm  · Stay active  · Manage your medical conditions  Home Safety Tips:  · Add items to prevent falls in the bathroom  · Keep paths clear  · Install bright lights in your home  · Keep items you use often on shelves within reach  · Paint or place reflective tape on the edges of your stairs  Urinary Incontinence   Urinary incontinence (UI)  is when you lose control of your bladder  UI develops because your bladder cannot store or empty urine properly  The 3 most common types of UI are stress incontinence, urge incontinence, or both  Medicines:   · May be given to help strengthen your bladder control  Report any side effects of medication to your healthcare provider  Do pelvic muscle exercises often:  Your pelvic muscles help you stop urinating  Squeeze these muscles tight for 5 seconds, then relax for 5 seconds  Gradually work up to squeezing for 10 seconds  Do 3 sets of 15 repetitions a day, or as directed  This will help strengthen your pelvic muscles and improve bladder control  Train your bladder:  Go to the bathroom at set times, such as every 2 hours, even if you do not feel the urge to go  You can also try to hold your urine when you feel the urge to go  For example, hold your urine for 5 minutes when you feel the urge to go  As that becomes easier, hold your urine for 10 minutes  Self-care:   · Keep a UI record  Write down how often you leak urine and how much you leak   Make a note of what you were doing when you leaked urine   · Drink liquids as directed  You may need to limit the amount of liquid you drink to help control your urine leakage  Do not drink any liquid right before you go to bed  Limit or do not have drinks that contain caffeine or alcohol  · Prevent constipation  Eat a variety of high-fiber foods  Good examples are high-fiber cereals, beans, vegetables, and whole-grain breads  Walking is the best way to trigger your intestines to have a bowel movement  · Exercise regularly and maintain a healthy weight  Weight loss and exercise will decrease pressure on your bladder and help you control your leakage  · Use a catheter as directed  to help empty your bladder  A catheter is a tiny, plastic tube that is put into your bladder to drain your urine  · Go to behavior therapy as directed  Behavior therapy may be used to help you learn to control your urge to urinate  Weight Management   Why it is important to manage your weight:  Being overweight increases your risk of health conditions such as heart disease, high blood pressure, type 2 diabetes, and certain types of cancer  It can also increase your risk for osteoarthritis, sleep apnea, and other respiratory problems  Aim for a slow, steady weight loss  Even a small amount of weight loss can lower your risk of health problems  How to lose weight safely:  A safe and healthy way to lose weight is to eat fewer calories and get regular exercise  You can lose up about 1 pound a week by decreasing the number of calories you eat by 500 calories each day  Healthy meal plan for weight management:  A healthy meal plan includes a variety of foods, contains fewer calories, and helps you stay healthy  A healthy meal plan includes the following:  · Eat whole-grain foods more often  A healthy meal plan should contain fiber  Fiber is the part of grains, fruits, and vegetables that is not broken down by your body   Whole-grain foods are healthy and provide extra fiber in your diet  Some examples of whole-grain foods are whole-wheat breads and pastas, oatmeal, brown rice, and bulgur  · Eat a variety of vegetables every day  Include dark, leafy greens such as spinach, kale, elizabeth greens, and mustard greens  Eat yellow and orange vegetables such as carrots, sweet potatoes, and winter squash  · Eat a variety of fruits every day  Choose fresh or canned fruit (canned in its own juice or light syrup) instead of juice  Fruit juice has very little or no fiber  · Eat low-fat dairy foods  Drink fat-free (skim) milk or 1% milk  Eat fat-free yogurt and low-fat cottage cheese  Try low-fat cheeses such as mozzarella and other reduced-fat cheeses  · Choose meat and other protein foods that are low in fat  Choose beans or other legumes such as split peas or lentils  Choose fish, skinless poultry (chicken or turkey), or lean cuts of red meat (beef or pork)  Before you cook meat or poultry, cut off any visible fat  · Use less fat and oil  Try baking foods instead of frying them  Add less fat, such as margarine, sour cream, regular salad dressing and mayonnaise to foods  Eat fewer high-fat foods  Some examples of high-fat foods include french fries, doughnuts, ice cream, and cakes  · Eat fewer sweets  Limit foods and drinks that are high in sugar  This includes candy, cookies, regular soda, and sweetened drinks  Exercise:  Exercise at least 30 minutes per day on most days of the week  Some examples of exercise include walking, biking, dancing, and swimming  You can also fit in more physical activity by taking the stairs instead of the elevator or parking farther away from stores  Ask your healthcare provider about the best exercise plan for you  © Copyright [employer] 2018 Information is for End User's use only and may not be sold, redistributed or otherwise used for commercial purposes   All illustrations and images included in CareNotes® are the copyrighted property of [employer]  or [facility]

## 2019-12-16 NOTE — Clinical Note
Karen Truong, this family is transferring their mother's care from [place], [place] where she lived with her  and are taking care of her at home  They would like a phone call about possible services in the home (physical/occupational therapies, etc ) I filled out forms for her to go to the [address] [place] [address] if you need anymore info   The daughter Laquita Howe) cell # [phone]

## 2019-12-16 NOTE — PROGRESS NOTES
Assessment and Plan:     Problem List Items Addressed This Visit        Cardiovascular and Mediastinum    Cerebrovascular accident [facility])    Relevant Orders    Comprehensive metabolic panel       Nervous and Auditory    Supranuclear palsy ([address] )    Relevant Orders    Comprehensive metabolic panel      Other Visit Diagnoses     Encounter for Medicare annual wellness exam    -  Primary    Visit for TB skin test        Relevant Orders    Quantiferon TB Gold Plus    Screening cholesterol level        Relevant Orders    Lipid panel        BMI Counseling: Body mass index is 28 51 kg/m²  The BMI is above normal  Nutrition recommendations include decreasing portion sizes, consuming healthier snacks, limiting drinks that contain sugar, moderation in carbohydrate intake and increasing intake of lean protein  Falls Plan of Care: not completed because patient not ambulatory, bed ridden, immobile, confined to chair, or wheelchair bound  Recommended assistive device to help with gait and balance  Home safety evaluation by OT recommended  Preventive health issues were discussed with patient, and age appropriate screening tests were ordered as noted in patient's After Visit Summary  Personalized health advice and appropriate referrals for health education or preventive services given if needed, as noted in patient's After Visit Summary  History of Present Illness:     Patient presents for Medicare Annual Wellness visit    Patient Care Team:  Jorge Alberto olson PCP - General (Family Medicine)     Problem List:     Patient Active Problem List   Diagnosis    Supranuclear palsy ([address] )    Cerebrovascular accident [facility])      Past Medical and Surgical History:     Past Medical History:   Diagnosis Date    Supranuclear palsy ([address] )      Past Surgical History:   Procedure Laterality Date    BACK SURGERY      HIP FRACTURE SURGERY        Family History:     History reviewed  No pertinent family history     Social History:     Social History     Socioeconomic History    Marital status: /Civil Union     Spouse name: None    Number of children: None    Years of education: None    Highest education level: None   Occupational History    None   Social Needs    Financial resource strain: None    Food insecurity:     Worry: None     Inability: None    Transportation needs:     Medical: None     Non-medical: None   Tobacco Use    Smoking status: Never Smoker    Smokeless tobacco: Never Used   Substance and Sexual Activity    Alcohol use: Never     Frequency: Never    Drug use: Never    Sexual activity: None   Lifestyle    Physical activity:     Days per week: None     Minutes per session: None    Stress: None   Relationships    Social connections:     Talks on phone: None     Gets together: None     Attends Denominational service: None     Active member of club or organization: None     Attends meetings of clubs or organizations: None     Relationship status: None    Intimate partner violence:     Fear of current or ex partner: None     Emotionally abused: None     Physically abused: None     Forced sexual activity: None   Other Topics Concern    None   Social History Narrative    None       Medications and Allergies:     Current Outpatient Medications   Medication Sig Dispense Refill    aspirin 81 mg chewable tablet Chew 81 mg daily      oxybutynin (DITROPAN-XL) 10 MG 24 hr tablet Take 10 mg by mouth daily at bedtime       No current facility-administered medications for this visit        Allergies   Allergen Reactions    Procaine Anaphylaxis and Other (See Comments)     Got a really sore mouth, long ago  Got a really sore mouth, long ago      Penicillins Other (See Comments)    Sulfa Antibiotics     Sulfacetamide       Immunizations:     Immunization History   Administered Date(s) Administered    INFLUENZA 11/14/2007, 11/03/2008, 12/03/2009, 11/09/2010, 10/19/2011, 09/12/2012, 11/20/2013, 09/29/2014, 01/02/2015, 10/09/2017, 10/25/2018    Influenza Whole 12/03/2009    Pneumococcal Conjugate 13-Valent 04/15/2015    Pneumococcal Polysaccharide PPV23 10/19/2011    TD (adult) Preservative Free 09/04/2017    Tetanus, adsorbed 09/04/2017      Health Maintenance: There are no preventive care reminders to display for this patient  Topic Date Due    DTaP,Tdap,and Td Vaccines (1 - Tdap) 07/[birth year]/1952    Influenza Vaccine  07/01/2019      Medicare Health Risk Assessment:     /66   Pulse 74   Temp 98 1 °F (36 7 °C)   Resp 18   Ht 5' 5 5" (1 664 m)   Wt 78 9 kg (174 lb)   SpO2 98%   BMI 28 51 kg/m²      Cecilio Dubin is here for her Subsequent Wellness visit  Health Risk Assessment:   Patient rates overall health as fair  Patient feels that their physical health rating is same  Eyesight was rated as same  Hearing was rated as same  Patient feels that their emotional and mental health rating is same  Pain experienced in the last 7 days has been none  Patient states that she has experienced no weight loss or gain in last 6 months  Depression Screening:   PHQ-2 Score: 0      Fall Risk Screening: In the past year, patient has experienced: history of falling in past year    Number of falls: 2 or more  Injured during fall?: No    Feels unsteady when standing or walking?: Yes    Worried about falling?: Yes      Urinary Incontinence Screening:   Patient has leaked urine accidently in the last six months  Has urologist appt after this visit- today at [address]:  Patient has trouble with stairs inside or outside of their home  Patient has working smoke alarms and has working carbon monoxide detector  Home safety hazards include: none  Nutrition:   Current diet is Regular  Medications:   Patient is not currently taking any over-the-counter supplements  Patient is not able to manage medications       Activities of Daily Living (ADLs)/Instrumental Activities of Daily Living (IADLs):   Walk and transfer into and out of bed and chair?: No  Dress and groom yourself?: No    Bathe or shower yourself?: No    Feed yourself?  Yes  Do your laundry/housekeeping?: No  Manage your money, pay your bills and track your expenses?: No  Make your own meals?: No    Do your own shopping?: No    Previous Hospitalizations:   Any hospitalizations or ED visits within the last 12 months?: No      PREVENTIVE SCREENINGS      Cardiovascular Screening:      Due for: Lipid Panel      Diabetes Screening:       Due for: Blood Glucose      Colorectal Cancer Screening:     General: Screening Not Indicated      Breast Cancer Screening:     General: Screening Not Indicated      Cervical Cancer Screening:    General: Screening Not Indicated      Osteoporosis Screening:    General: Screening Not Indicated      Abdominal Aortic Aneurysm (AAA) Screening:        General: Screening Not Indicated      Lung Cancer Screening:     General: Screening Not Indicated      Hepatitis C Screening:    General: Screening Not Indicated      GEORGIA Rodgers

## 2019-12-17 ENCOUNTER — PATIENT OUTREACH (OUTPATIENT)
Dept: FAMILY MEDICINE CLINIC | Facility: CLINIC | Age: 79
End: 2019-12-17

## 2019-12-17 DIAGNOSIS — Z71.89 ENCOUNTER FOR COUNSELING FOR CARE MANAGEMENT OF PATIENT WITH CHRONIC CONDITIONS AND COMPLEX HEALTH NEEDS USING NURSE-BASED MODEL: Primary | ICD-10-CM

## 2019-12-18 ENCOUNTER — PATIENT OUTREACH (OUTPATIENT)
Dept: FAMILY MEDICINE CLINIC | Facility: CLINIC | Age: 79
End: 2019-12-18

## 2019-12-18 ENCOUNTER — APPOINTMENT (OUTPATIENT)
Dept: LAB | Facility: HOSPITAL | Age: 79
End: 2019-12-18
Payer: MEDICARE

## 2019-12-18 DIAGNOSIS — G23.1 SUPRANUCLEAR PALSY (HCC): ICD-10-CM

## 2019-12-18 DIAGNOSIS — G23.1 SUPRANUCLEAR PALSY (HCC): Primary | ICD-10-CM

## 2019-12-18 DIAGNOSIS — Z13.220 SCREENING CHOLESTEROL LEVEL: ICD-10-CM

## 2019-12-18 DIAGNOSIS — I63.89 CEREBROVASCULAR ACCIDENT (CVA) DUE TO OTHER MECHANISM (HCC): ICD-10-CM

## 2019-12-18 DIAGNOSIS — Z11.1 VISIT FOR TB SKIN TEST: ICD-10-CM

## 2019-12-18 LAB
ALBUMIN SERPL BCP-MCNC: 3.3 G/DL (ref 3.5–5)
ALP SERPL-CCNC: 94 U/L (ref 46–116)
ALT SERPL W P-5'-P-CCNC: 23 U/L (ref 12–78)
ANION GAP SERPL CALCULATED.3IONS-SCNC: 4 MMOL/L (ref 4–13)
AST SERPL W P-5'-P-CCNC: 15 U/L (ref 5–45)
BILIRUB SERPL-MCNC: 0.63 MG/DL (ref 0.2–1)
BUN SERPL-MCNC: 15 MG/DL (ref 5–25)
CALCIUM SERPL-MCNC: 9.6 MG/DL (ref 8.3–10.1)
CHLORIDE SERPL-SCNC: 111 MMOL/L (ref 100–108)
CHOLEST SERPL-MCNC: 207 MG/DL (ref 50–200)
CO2 SERPL-SCNC: 28 MMOL/L (ref 21–32)
CREAT SERPL-MCNC: 0.83 MG/DL (ref 0.6–1.3)
GFR SERPL CREATININE-BSD FRML MDRD: 68 ML/MIN/1.73SQ M
GLUCOSE SERPL-MCNC: 106 MG/DL (ref 65–140)
HDLC SERPL-MCNC: 58 MG/DL
LDLC SERPL CALC-MCNC: 116 MG/DL (ref 0–100)
NONHDLC SERPL-MCNC: 149 MG/DL
POTASSIUM SERPL-SCNC: 4.2 MMOL/L (ref 3.5–5.3)
PROT SERPL-MCNC: 6.6 G/DL (ref 6.4–8.2)
SODIUM SERPL-SCNC: 143 MMOL/L (ref 136–145)
TRIGL SERPL-MCNC: 166 MG/DL

## 2019-12-18 PROCEDURE — 80061 LIPID PANEL: CPT

## 2019-12-18 PROCEDURE — 36415 COLL VENOUS BLD VENIPUNCTURE: CPT

## 2019-12-18 PROCEDURE — 86480 TB TEST CELL IMMUN MEASURE: CPT

## 2019-12-18 PROCEDURE — 80053 COMPREHEN METABOLIC PANEL: CPT

## 2019-12-18 NOTE — PROGRESS NOTES
Outpatient Care Management Note:  Reached out to Addie's daughter, Annamarie Whitten is questioning whether or not her mother would qualify for home PT/OT  Advised that based on the office notes dated 12/16/19 she would  Annamarie Whitten would like a consult placed to [facility] for PT/OT  Referral entered  Also discussed possibly using a service like [address] [place] [address] rehab after completion of VNA services  Addie qualified for services through the [facility] and [place] [facility] questioned if she could obtain those services here  She did call the [address] office and was advised to call them back after she had Addie's address changed  Also advised to reach out to her mother's  in [address] to see if services could be transferred instead of a new intake being done  Verbalized understanding of same  Annamarie Whitten questioned if she could just have her mother's address changed through the post office, advised her that she would also need to change is through the social security web site if this is going to be a permanent move  The family has not made that decision yet as Addie's  is still alive but unable to care for her  Provided my contact information should she have any other questions

## 2019-12-20 LAB
GAMMA INTERFERON BACKGROUND BLD IA-ACNC: 0.03 IU/ML
M TB IFN-G BLD-IMP: NEGATIVE
M TB IFN-G CD4+ BCKGRND COR BLD-ACNC: 0 IU/ML
M TB IFN-G CD4+ BCKGRND COR BLD-ACNC: 0.01 IU/ML
MITOGEN IGNF BCKGRD COR BLD-ACNC: >10 IU/ML

## 2020-03-03 ENCOUNTER — TELEPHONE (OUTPATIENT)
Dept: FAMILY MEDICINE CLINIC | Facility: CLINIC | Age: 80
End: 2020-03-03

## 2020-03-03 ENCOUNTER — APPOINTMENT (OUTPATIENT)
Dept: LAB | Facility: CLINIC | Age: 80
End: 2020-03-03
Payer: MEDICARE

## 2020-03-03 DIAGNOSIS — R41.0 CONFUSION: Primary | ICD-10-CM

## 2020-03-03 DIAGNOSIS — R41.0 CONFUSION: ICD-10-CM

## 2020-03-03 LAB
ALBUMIN SERPL BCP-MCNC: 3.7 G/DL (ref 3.5–5)
ALP SERPL-CCNC: 101 U/L (ref 46–116)
ALT SERPL W P-5'-P-CCNC: 22 U/L (ref 12–78)
ANION GAP SERPL CALCULATED.3IONS-SCNC: 8 MMOL/L (ref 4–13)
AST SERPL W P-5'-P-CCNC: 20 U/L (ref 5–45)
BASOPHILS # BLD AUTO: 0.03 THOUSANDS/ΜL (ref 0–0.1)
BASOPHILS NFR BLD AUTO: 1 % (ref 0–1)
BILIRUB SERPL-MCNC: 0.47 MG/DL (ref 0.2–1)
BUN SERPL-MCNC: 17 MG/DL (ref 5–25)
CALCIUM SERPL-MCNC: 9.4 MG/DL (ref 8.3–10.1)
CHLORIDE SERPL-SCNC: 110 MMOL/L (ref 100–108)
CO2 SERPL-SCNC: 23 MMOL/L (ref 21–32)
CREAT SERPL-MCNC: 0.75 MG/DL (ref 0.6–1.3)
EOSINOPHIL # BLD AUTO: 0.1 THOUSAND/ΜL (ref 0–0.61)
EOSINOPHIL NFR BLD AUTO: 2 % (ref 0–6)
ERYTHROCYTE [DISTWIDTH] IN BLOOD BY AUTOMATED COUNT: 13.5 % (ref 11.6–15.1)
GFR SERPL CREATININE-BSD FRML MDRD: 77 ML/MIN/1.73SQ M
GLUCOSE P FAST SERPL-MCNC: 85 MG/DL (ref 65–99)
HCT VFR BLD AUTO: 47.7 % (ref 34.8–46.1)
HGB BLD-MCNC: 15.5 G/DL (ref 11.5–15.4)
IMM GRANULOCYTES # BLD AUTO: 0.01 THOUSAND/UL (ref 0–0.2)
IMM GRANULOCYTES NFR BLD AUTO: 0 % (ref 0–2)
LYMPHOCYTES # BLD AUTO: 1.18 THOUSANDS/ΜL (ref 0.6–4.47)
LYMPHOCYTES NFR BLD AUTO: 22 % (ref 14–44)
MCH RBC QN AUTO: 30 PG (ref 26.8–34.3)
MCHC RBC AUTO-ENTMCNC: 32.5 G/DL (ref 31.4–37.4)
MCV RBC AUTO: 92 FL (ref 82–98)
MONOCYTES # BLD AUTO: 0.92 THOUSAND/ΜL (ref 0.17–1.22)
MONOCYTES NFR BLD AUTO: 17 % (ref 4–12)
NEUTROPHILS # BLD AUTO: 3.08 THOUSANDS/ΜL (ref 1.85–7.62)
NEUTS SEG NFR BLD AUTO: 58 % (ref 43–75)
NRBC BLD AUTO-RTO: 0 /100 WBCS
PLATELET # BLD AUTO: 190 THOUSANDS/UL (ref 149–390)
PMV BLD AUTO: 11.6 FL (ref 8.9–12.7)
POTASSIUM SERPL-SCNC: 3.8 MMOL/L (ref 3.5–5.3)
PROT SERPL-MCNC: 7.4 G/DL (ref 6.4–8.2)
RBC # BLD AUTO: 5.17 MILLION/UL (ref 3.81–5.12)
SODIUM SERPL-SCNC: 141 MMOL/L (ref 136–145)
TSH SERPL DL<=0.05 MIU/L-ACNC: 1.69 UIU/ML (ref 0.36–3.74)
WBC # BLD AUTO: 5.32 THOUSAND/UL (ref 4.31–10.16)

## 2020-03-03 PROCEDURE — 80053 COMPREHEN METABOLIC PANEL: CPT

## 2020-03-03 PROCEDURE — 36415 COLL VENOUS BLD VENIPUNCTURE: CPT

## 2020-03-03 PROCEDURE — 85025 COMPLETE CBC W/AUTO DIFF WBC: CPT

## 2020-03-03 PROCEDURE — 84443 ASSAY THYROID STIM HORMONE: CPT

## 2020-03-03 PROCEDURE — 87086 URINE CULTURE/COLONY COUNT: CPT

## 2020-03-04 LAB — BACTERIA UR CULT: NORMAL

## 2020-04-09 ENCOUNTER — TELEMEDICINE (OUTPATIENT)
Dept: FAMILY MEDICINE CLINIC | Facility: CLINIC | Age: 80
End: 2020-04-09
Payer: MEDICARE

## 2020-04-09 DIAGNOSIS — N39.0 URINARY TRACT INFECTION WITHOUT HEMATURIA, SITE UNSPECIFIED: ICD-10-CM

## 2020-04-09 DIAGNOSIS — K59.04 CHRONIC IDIOPATHIC CONSTIPATION: ICD-10-CM

## 2020-04-09 DIAGNOSIS — N32.81 OAB (OVERACTIVE BLADDER): ICD-10-CM

## 2020-04-09 DIAGNOSIS — G23.1 SUPRANUCLEAR PALSY (HCC): Primary | ICD-10-CM

## 2020-04-09 PROCEDURE — 99214 OFFICE O/P EST MOD 30 MIN: CPT | Performed by: NURSE PRACTITIONER

## 2020-04-10 VITALS — RESPIRATION RATE: 16 BRPM

## 2020-04-10 RX ORDER — CEPHALEXIN 500 MG/1
500 CAPSULE ORAL EVERY 8 HOURS SCHEDULED
Qty: 30 CAPSULE | Refills: 0 | Status: SHIPPED | OUTPATIENT
Start: 2020-04-10 | End: 2020-04-20

## 2020-04-10 RX ORDER — SENNA AND DOCUSATE SODIUM 50; 8.6 MG/1; MG/1
1 TABLET, FILM COATED ORAL DAILY
Qty: 90 TABLET | Refills: 1 | Status: SHIPPED | OUTPATIENT
Start: 2020-04-10

## 2020-04-10 RX ORDER — OXYBUTYNIN CHLORIDE 10 MG/1
10 TABLET, EXTENDED RELEASE ORAL
Qty: 90 TABLET | Refills: 1 | Status: SHIPPED | OUTPATIENT
Start: 2020-04-10

## 2020-04-20 ENCOUNTER — TELEPHONE (OUTPATIENT)
Dept: FAMILY MEDICINE CLINIC | Facility: CLINIC | Age: 80
End: 2020-04-20

## 2020-04-23 ENCOUNTER — TELEMEDICINE (OUTPATIENT)
Dept: FAMILY MEDICINE CLINIC | Facility: CLINIC | Age: 80
End: 2020-04-23
Payer: MEDICARE

## 2020-04-23 DIAGNOSIS — I63.89 CEREBROVASCULAR ACCIDENT (CVA) DUE TO OTHER MECHANISM (HCC): ICD-10-CM

## 2020-04-23 DIAGNOSIS — G23.1 SUPRANUCLEAR PALSY (HCC): ICD-10-CM

## 2020-04-23 DIAGNOSIS — N39.0 URINARY TRACT INFECTION WITHOUT HEMATURIA, SITE UNSPECIFIED: Primary | ICD-10-CM

## 2020-04-23 DIAGNOSIS — R26.9 GAIT DISTURBANCE: Primary | ICD-10-CM

## 2020-04-23 DIAGNOSIS — N39.0 RECURRENT UTI: ICD-10-CM

## 2020-04-23 PROCEDURE — 99443 PR PHYS/QHP TELEPHONE EVALUATION 21-30 MIN: CPT | Performed by: NURSE PRACTITIONER

## 2020-04-23 RX ORDER — CEPHALEXIN 500 MG/1
500 CAPSULE ORAL EVERY 8 HOURS SCHEDULED
Qty: 30 CAPSULE | Refills: 0 | Status: SHIPPED | OUTPATIENT
Start: 2020-04-23 | End: 2020-05-03

## 2020-05-07 ENCOUNTER — APPOINTMENT (OUTPATIENT)
Dept: LAB | Facility: CLINIC | Age: 80
End: 2020-05-07
Payer: MEDICARE

## 2020-05-07 LAB
BILIRUB UR QL STRIP: NEGATIVE
CLARITY UR: CLEAR
COLOR UR: YELLOW
GLUCOSE UR STRIP-MCNC: NEGATIVE MG/DL
HGB UR QL STRIP.AUTO: NEGATIVE
KETONES UR STRIP-MCNC: NEGATIVE MG/DL
LEUKOCYTE ESTERASE UR QL STRIP: NEGATIVE
NITRITE UR QL STRIP: NEGATIVE
PH UR STRIP.AUTO: 6.5 [PH]
PROT UR STRIP-MCNC: NEGATIVE MG/DL
SP GR UR STRIP.AUTO: 1.02 (ref 1–1.03)
UROBILINOGEN UR QL STRIP.AUTO: 0.2 E.U./DL

## 2020-05-07 PROCEDURE — 81003 URINALYSIS AUTO W/O SCOPE: CPT | Performed by: NURSE PRACTITIONER

## 2020-06-08 ENCOUNTER — CLINICAL SUPPORT (OUTPATIENT)
Dept: FAMILY MEDICINE CLINIC | Facility: CLINIC | Age: 80
End: 2020-06-08
Payer: MEDICARE

## 2020-06-08 ENCOUNTER — TELEPHONE (OUTPATIENT)
Dept: FAMILY MEDICINE CLINIC | Facility: CLINIC | Age: 80
End: 2020-06-08

## 2020-06-08 DIAGNOSIS — R35.0 FREQUENT URINATION: Primary | ICD-10-CM

## 2020-06-08 DIAGNOSIS — N39.0 RECURRENT UTI: Primary | ICD-10-CM

## 2020-06-08 LAB
SL AMB  POCT GLUCOSE, UA: NORMAL
SL AMB LEUKOCYTE ESTERASE,UA: NORMAL
SL AMB POCT BILIRUBIN,UA: NORMAL
SL AMB POCT BLOOD,UA: NORMAL
SL AMB POCT CLARITY,UA: CLEAR
SL AMB POCT COLOR,UA: YELLOW
SL AMB POCT KETONES,UA: NORMAL
SL AMB POCT NITRITE,UA: NORMAL
SL AMB POCT PH,UA: 7
SL AMB POCT SPECIFIC GRAVITY,UA: 1.02
SL AMB POCT URINE PROTEIN: NORMAL
SL AMB POCT UROBILINOGEN: 0.2

## 2020-06-08 PROCEDURE — 87086 URINE CULTURE/COLONY COUNT: CPT | Performed by: NURSE PRACTITIONER

## 2020-06-08 PROCEDURE — 81002 URINALYSIS NONAUTO W/O SCOPE: CPT

## 2020-06-08 RX ORDER — CEPHALEXIN 500 MG/1
500 CAPSULE ORAL EVERY 6 HOURS SCHEDULED
Qty: 40 CAPSULE | Refills: 0 | Status: SHIPPED | OUTPATIENT
Start: 2020-06-08 | End: 2020-06-18

## 2020-06-10 LAB
BACTERIA UR CULT: NORMAL
BACTERIA UR CULT: NORMAL

## 2020-07-03 ENCOUNTER — TELEPHONE (OUTPATIENT)
Dept: OTHER | Facility: OTHER | Age: 80
End: 2020-07-03

## 2020-07-03 NOTE — TELEPHONE ENCOUNTER
Patient missed her PT home health visits this week due to scheduling conflicts  The daughter did advise the office  She is scheduled for 2 more visits this upcoming week  She did do her OT and nursing visits

## 2020-07-15 ENCOUNTER — HOSPITAL ENCOUNTER (INPATIENT)
Facility: HOSPITAL | Age: 80
LOS: 1 days | Discharge: HOME HEALTH | DRG: 183 | End: 2020-07-17
Attending: EMERGENCY MEDICINE | Admitting: SURGERY
Payer: MEDICARE

## 2020-07-15 ENCOUNTER — OFFICE VISIT (OUTPATIENT)
Dept: URGENT CARE | Facility: CLINIC | Age: 80
End: 2020-07-15
Payer: MEDICARE

## 2020-07-15 ENCOUNTER — APPOINTMENT (OUTPATIENT)
Dept: RADIOLOGY | Facility: CLINIC | Age: 80
End: 2020-07-15
Payer: MEDICARE

## 2020-07-15 ENCOUNTER — APPOINTMENT (EMERGENCY)
Dept: RADIOLOGY | Facility: HOSPITAL | Age: 80
DRG: 183 | End: 2020-07-15
Payer: MEDICARE

## 2020-07-15 ENCOUNTER — TELEPHONE (OUTPATIENT)
Dept: FAMILY MEDICINE CLINIC | Facility: CLINIC | Age: 80
End: 2020-07-15

## 2020-07-15 VITALS
OXYGEN SATURATION: 96 % | TEMPERATURE: 98.4 F | HEART RATE: 74 BPM | SYSTOLIC BLOOD PRESSURE: 167 MMHG | RESPIRATION RATE: 18 BRPM | DIASTOLIC BLOOD PRESSURE: 81 MMHG

## 2020-07-15 DIAGNOSIS — S22.42XA MULTIPLE FRACTURES OF RIBS, LEFT SIDE, INITIAL ENCOUNTER FOR CLOSED FRACTURE: Primary | ICD-10-CM

## 2020-07-15 DIAGNOSIS — N30.00 ACUTE CYSTITIS WITHOUT HEMATURIA: ICD-10-CM

## 2020-07-15 DIAGNOSIS — R07.81 RIB PAIN ON LEFT SIDE: ICD-10-CM

## 2020-07-15 DIAGNOSIS — T14.8XXA BRUISING: ICD-10-CM

## 2020-07-15 DIAGNOSIS — S22.49XA MULTIPLE RIB FRACTURES: Primary | ICD-10-CM

## 2020-07-15 DIAGNOSIS — G23.1 SUPRANUCLEAR PALSY (HCC): ICD-10-CM

## 2020-07-15 LAB
ANION GAP SERPL CALCULATED.3IONS-SCNC: 6 MMOL/L (ref 4–13)
BASOPHILS # BLD AUTO: 0.03 THOUSANDS/ΜL (ref 0–0.1)
BASOPHILS NFR BLD AUTO: 0 % (ref 0–1)
BUN SERPL-MCNC: 15 MG/DL (ref 5–25)
CALCIUM SERPL-MCNC: 10.1 MG/DL (ref 8.3–10.1)
CHLORIDE SERPL-SCNC: 111 MMOL/L (ref 100–108)
CO2 SERPL-SCNC: 25 MMOL/L (ref 21–32)
CREAT SERPL-MCNC: 0.84 MG/DL (ref 0.6–1.3)
EOSINOPHIL # BLD AUTO: 0.05 THOUSAND/ΜL (ref 0–0.61)
EOSINOPHIL NFR BLD AUTO: 1 % (ref 0–6)
ERYTHROCYTE [DISTWIDTH] IN BLOOD BY AUTOMATED COUNT: 13.8 % (ref 11.6–15.1)
GFR SERPL CREATININE-BSD FRML MDRD: 66 ML/MIN/1.73SQ M
GLUCOSE SERPL-MCNC: 90 MG/DL (ref 65–140)
HCT VFR BLD AUTO: 49.9 % (ref 34.8–46.1)
HGB BLD-MCNC: 16.3 G/DL (ref 11.5–15.4)
IMM GRANULOCYTES # BLD AUTO: 0.03 THOUSAND/UL (ref 0–0.2)
IMM GRANULOCYTES NFR BLD AUTO: 0 % (ref 0–2)
LYMPHOCYTES # BLD AUTO: 1.62 THOUSANDS/ΜL (ref 0.6–4.47)
LYMPHOCYTES NFR BLD AUTO: 20 % (ref 14–44)
MCH RBC QN AUTO: 30.1 PG (ref 26.8–34.3)
MCHC RBC AUTO-ENTMCNC: 32.7 G/DL (ref 31.4–37.4)
MCV RBC AUTO: 92 FL (ref 82–98)
MONOCYTES # BLD AUTO: 0.76 THOUSAND/ΜL (ref 0.17–1.22)
MONOCYTES NFR BLD AUTO: 9 % (ref 4–12)
NEUTROPHILS # BLD AUTO: 5.77 THOUSANDS/ΜL (ref 1.85–7.62)
NEUTS SEG NFR BLD AUTO: 70 % (ref 43–75)
NRBC BLD AUTO-RTO: 0 /100 WBCS
PLATELET # BLD AUTO: 228 THOUSANDS/UL (ref 149–390)
PLATELET # BLD AUTO: 235 THOUSANDS/UL (ref 149–390)
PMV BLD AUTO: 9.8 FL (ref 8.9–12.7)
PMV BLD AUTO: 9.8 FL (ref 8.9–12.7)
POTASSIUM SERPL-SCNC: 4.3 MMOL/L (ref 3.5–5.3)
RBC # BLD AUTO: 5.41 MILLION/UL (ref 3.81–5.12)
SODIUM SERPL-SCNC: 142 MMOL/L (ref 136–145)
WBC # BLD AUTO: 8.26 THOUSAND/UL (ref 4.31–10.16)

## 2020-07-15 PROCEDURE — 99213 OFFICE O/P EST LOW 20 MIN: CPT | Performed by: NURSE PRACTITIONER

## 2020-07-15 PROCEDURE — 85025 COMPLETE CBC W/AUTO DIFF WBC: CPT | Performed by: EMERGENCY MEDICINE

## 2020-07-15 PROCEDURE — 71101 X-RAY EXAM UNILAT RIBS/CHEST: CPT

## 2020-07-15 PROCEDURE — 70450 CT HEAD/BRAIN W/O DYE: CPT

## 2020-07-15 PROCEDURE — 71260 CT THORAX DX C+: CPT

## 2020-07-15 PROCEDURE — 99219 PR INITIAL OBSERVATION CARE/DAY 50 MINUTES: CPT | Performed by: SURGERY

## 2020-07-15 PROCEDURE — 99285 EMERGENCY DEPT VISIT HI MDM: CPT

## 2020-07-15 PROCEDURE — 85049 AUTOMATED PLATELET COUNT: CPT | Performed by: SURGERY

## 2020-07-15 PROCEDURE — 36415 COLL VENOUS BLD VENIPUNCTURE: CPT | Performed by: EMERGENCY MEDICINE

## 2020-07-15 PROCEDURE — G0463 HOSPITAL OUTPT CLINIC VISIT: HCPCS | Performed by: NURSE PRACTITIONER

## 2020-07-15 PROCEDURE — 93005 ELECTROCARDIOGRAM TRACING: CPT

## 2020-07-15 PROCEDURE — 99285 EMERGENCY DEPT VISIT HI MDM: CPT | Performed by: EMERGENCY MEDICINE

## 2020-07-15 PROCEDURE — 74177 CT ABD & PELVIS W/CONTRAST: CPT

## 2020-07-15 PROCEDURE — 80048 BASIC METABOLIC PNL TOTAL CA: CPT | Performed by: EMERGENCY MEDICINE

## 2020-07-15 PROCEDURE — 1124F ACP DISCUSS-NO DSCNMKR DOCD: CPT | Performed by: NURSE PRACTITIONER

## 2020-07-15 RX ORDER — OXYCODONE HYDROCHLORIDE 5 MG/1
2.5 TABLET ORAL EVERY 4 HOURS PRN
Status: DISCONTINUED | OUTPATIENT
Start: 2020-07-15 | End: 2020-07-17 | Stop reason: HOSPADM

## 2020-07-15 RX ORDER — OXYCODONE HYDROCHLORIDE 5 MG/1
2.5 TABLET ORAL ONCE
Status: COMPLETED | OUTPATIENT
Start: 2020-07-15 | End: 2020-07-15

## 2020-07-15 RX ORDER — ASPIRIN 81 MG/1
81 TABLET, CHEWABLE ORAL DAILY
Status: DISCONTINUED | OUTPATIENT
Start: 2020-07-16 | End: 2020-07-17 | Stop reason: HOSPADM

## 2020-07-15 RX ORDER — OXYBUTYNIN CHLORIDE 10 MG/1
10 TABLET, EXTENDED RELEASE ORAL
Status: DISCONTINUED | OUTPATIENT
Start: 2020-07-15 | End: 2020-07-17 | Stop reason: HOSPADM

## 2020-07-15 RX ORDER — LIDOCAINE 50 MG/G
1 PATCH TOPICAL DAILY
Status: DISCONTINUED | OUTPATIENT
Start: 2020-07-16 | End: 2020-07-17 | Stop reason: HOSPADM

## 2020-07-15 RX ORDER — HYDROMORPHONE HCL/PF 1 MG/ML
0.2 SYRINGE (ML) INJECTION EVERY 4 HOURS PRN
Status: DISCONTINUED | OUTPATIENT
Start: 2020-07-15 | End: 2020-07-17

## 2020-07-15 RX ORDER — AMOXICILLIN 250 MG
1 CAPSULE ORAL DAILY
Status: DISCONTINUED | OUTPATIENT
Start: 2020-07-16 | End: 2020-07-17 | Stop reason: HOSPADM

## 2020-07-15 RX ADMIN — OXYCODONE HYDROCHLORIDE 2.5 MG: 5 TABLET ORAL at 12:52

## 2020-07-15 RX ADMIN — IOHEXOL 100 ML: 350 INJECTION, SOLUTION INTRAVENOUS at 14:48

## 2020-07-15 RX ADMIN — OXYCODONE HYDROCHLORIDE 2.5 MG: 5 TABLET ORAL at 17:03

## 2020-07-15 RX ADMIN — OXYBUTYNIN 10 MG: 10 TABLET, FILM COATED, EXTENDED RELEASE ORAL at 23:19

## 2020-07-15 NOTE — PATIENT INSTRUCTIONS
As we discussed I would recommend going to ER for evaluation of 4 broken ribs  2 appear displaced  VSS are stable and pulse ox 96% with no chest pain or SOB  Will have daughters drive her to ER    ER notified of her arrival

## 2020-07-15 NOTE — PROGRESS NOTES
[facility]        NAME: Danica Klein is a 79 y o  female  : 1940    MRN: [id number]  DATE: July 15, 2020  TIME: 11: 00 AM     Assessment and Plan   Multiple fractures of ribs, left side, initial encounter for closed fracture [S22 42XA]  1  Multiple fractures of ribs, left side, initial encounter for closed fracture     2  Bruising  XR ribs left w pa chest min 3 views    CANCELED: XR ribs left w pa chest min 3 views   3  Rib pain on left side  XR ribs left w pa chest min 3 views    CANCELED: XR ribs left w pa chest min 3 views     Will send to the ER as patient has 5 broken ribs  To ribs or displaced  Ribs 6 through 10 are fractured  Vital signs stable and she has no acute distress  Daughters will drive her    Patient Instructions     Patient Instructions   As we discussed I would recommend going to ER for evaluation of 4 broken ribs  2 appear displaced  VSS are stable and pulse ox 96% with no chest pain or SOB  Will have daughters drive her to ER  ER notified of her arrival        Chief Complaint     Chief Complaint   Patient presents with    Pain     Patient c/o left side pain after fall 3 days ago  Per family, patient denies hitting head or LOC  History of Present Illness   Danica Klein presents to the clinic c/o    This is a 78-year-old female here today with complaints of left-sided rib pain  She is here today with her daughters  Daughters note that thinks she may have fell on  night  Daughter does not believe that she fell to the ground as she did not hear her and did not assist to help her up  She lives with her daughters but alternates house is on certain days of the week  Daughter states that she has noticed increased pain yesterday and she was moaning and when she attempted to raise her left arm she stated she had pain  Today patient requested to be seen due to pain  Daughter notes she has a high level pain tolerance  Patient denies hitting her head  No headaches, nausea, vomiting  Patient denies any shortness of breath or chest   She denies any difficulty with breathing  Daughter has noticed bruising over the left lateral ribs  Review of Systems   Review of Systems   Constitutional: Negative for activity change, fatigue and fever  Respiratory: Negative  Cardiovascular: Negative  Gastrointestinal: Negative  Musculoskeletal:        Left rib pain    Skin: Negative  Psychiatric/Behavioral: Negative  Current Medications     Long-Term Medications   Medication Sig Dispense Refill    aspirin 81 mg chewable tablet Chew 81 mg daily      oxybutynin (DITROPAN-XL) 10 MG 24 hr tablet Take 1 tablet (10 mg total) by mouth daily at bedtime 90 tablet 1    senna-docusate sodium (SENOKOT-S) 8 6-50 mg per tablet Take 1 tablet by mouth daily 90 tablet 1       Current Allergies     Allergies as of 07/15/2020 - Reviewed 07/15/2020   Allergen Reaction Noted    Procaine Anaphylaxis and Other (See Comments) 10/19/2011    Penicillins Other (See Comments) 11/02/2007    Sulfa antibiotics  11/07/2016    Sulfacetamide  11/02/2007            The following portions of the patient's history were reviewed and updated as appropriate: allergies, current medications, past family history, past medical history, past social history, past surgical history and problem list     Objective   /81   Pulse 74   Temp 98 4 °F (36 9 °C) (Temporal)   Resp 18   SpO2 96%        Physical Exam     Physical Exam   Constitutional: She is oriented to person, place, and time  Frail female chronically ill    Cardiovascular: Normal rate and regular rhythm  Pulmonary/Chest: Effort normal and breath sounds normal    Musculoskeletal:   Left rib:  Tenderness to palpate over the lower lateral ribs  There is moderate bruising noted  Pain with raising the arm  Neurological: She is alert and oriented to person, place, and time  Psychiatric: She has a normal mood and affect  Her behavior is normal    Nursing note and vitals reviewed

## 2020-07-15 NOTE — ED PROVIDER NOTES
History  Chief Complaint   Patient presents with   Lizbeth Adams     pt fell sunday out of bed, denies head strike, was told from urgent care 5 broken ribs on the left side     Patient presents for evaluation of multiple rib fracture  Medical history significant for progressive supranuclear palsy  Daughter is bedside to help provide history  She states that earlier today her mother started to complain of left-sided chest pain and that she was not able to fully range her left arm secondary to pain  This came as a surprise to the daughter, and upon further questioning the patient stated that she fell out of bed on Sunday but was able to get up by herself  Over the progressing days she then has been having progressive pain  They went to a prompt Care and an x-ray was done which showed fractures of ribs 6 through 10, 7 in 8 mildly displaced  At that point they were notified that they should come into the emergency department for further evaluation  Patient denies any head strike, nausea vomiting, change in vision since that time  Daughter states that her mother has been acting her normal self  Prior to Admission Medications   Prescriptions Last Dose Informant Patient Reported? Taking?   aspirin 81 mg chewable tablet  Self Yes No   Sig: Chew 81 mg daily   oxybutynin (DITROPAN-XL) 10 MG 24 hr tablet   No No   Sig: Take 1 tablet (10 mg total) by mouth daily at bedtime   senna-docusate sodium (SENOKOT-S) 8 6-50 mg per tablet   No No   Sig: Take 1 tablet by mouth daily      Facility-Administered Medications: None       Past Medical History:   Diagnosis Date    Supranuclear palsy ([address] )        Past Surgical History:   Procedure Laterality Date    BACK SURGERY      HIP FRACTURE SURGERY         History reviewed  No pertinent family history  I have reviewed and agree with the history as documented      E-Cigarette/Vaping     E-Cigarette/Vaping Substances     Social History     Tobacco Use    Smoking status: Never Smoker    Smokeless tobacco: Never Used   Substance Use Topics    Alcohol use: Never     Frequency: Never    Drug use: Never        Review of Systems   Constitutional: Negative for chills, diaphoresis, fatigue and fever  Respiratory: Negative for cough and shortness of breath  Cardiovascular: Positive for chest pain  Negative for palpitations  Gastrointestinal: Negative for abdominal distention, abdominal pain, constipation, diarrhea, nausea and vomiting  Genitourinary: Negative for dysuria, frequency and hematuria  Musculoskeletal: Positive for arthralgias and myalgias  Negative for neck pain  Neurological: Negative for dizziness, syncope, light-headedness and headaches  All other systems reviewed and are negative  Physical Exam  ED Triage Vitals [07/15/20 1218]   Temperature Pulse Respirations Blood Pressure SpO2   98 2 °F (36 8 °C) 82 18 159/88 95 %      Temp Source Heart Rate Source Patient Position - Orthostatic VS BP Location FiO2 (%)   Oral Monitor Lying Right arm --      Pain Score       9             Orthostatic Vital Signs  Vitals:    07/15/20 1218 07/15/20 1345 07/15/20 1500 07/15/20 1913   BP: 159/88  141/83 134/69   Pulse: 82 68 68 71   Patient Position - Orthostatic VS: Lying  Lying Sitting       Physical Exam   Constitutional: She is oriented to person, place, and time  She appears well-nourished  No distress  HENT:   Head: Normocephalic and atraumatic  Right Ear: External ear normal    Left Ear: External ear normal    Mouth/Throat: Oropharynx is clear and moist    Eyes: Pupils are equal, round, and reactive to light  Conjunctivae and EOM are normal  Right eye exhibits no discharge  Left eye exhibits no discharge  No scleral icterus  Neck: Normal range of motion  Neck supple  No JVD present  Cardiovascular: Normal rate, regular rhythm, normal heart sounds and intact distal pulses  Exam reveals no gallop and no friction rub  No murmur heard    Pulmonary/Chest: Effort normal and breath sounds normal  No respiratory distress  She has no wheezes  She has no rales  She exhibits tenderness  Patient has tenderness with palpation approximately rib 6 through 8, ecchymosis, good bilateral breath sounds  Abdominal: Soft  Bowel sounds are normal  She exhibits no distension and no mass  There is no tenderness  There is no guarding  Musculoskeletal: Normal range of motion  She exhibits no tenderness or deformity  Neurological: She is alert and oriented to person, place, and time  No cranial nerve deficit or sensory deficit  She exhibits normal muscle tone  Coordination normal    Skin: Skin is warm  She is not diaphoretic  Psychiatric: She has a normal mood and affect  Her behavior is normal  Judgment and thought content normal    Vitals reviewed  ED Medications  Medications   enoxaparin (LOVENOX) subcutaneous injection 30 mg (has no administration in time range)   lidocaine (LIDODERM) 5 % patch 1 patch (has no administration in time range)   HYDROmorphone (DILAUDID) injection 0 2 mg (has no administration in time range)   oxyCODONE (ROXICODONE) IR tablet 2 5 mg (2 5 mg Oral Given 7/15/20 1703)   aspirin chewable tablet 81 mg (has no administration in time range)   oxybutynin (DITROPAN-XL) 24 hr tablet 10 mg (has no administration in time range)   senna-docusate sodium (SENOKOT S) 8 6-50 mg per tablet 1 tablet (has no administration in time range)   oxyCODONE (ROXICODONE) IR tablet 2 5 mg (2 5 mg Oral Given 7/15/20 1252)   iohexol (OMNIPAQUE) 350 MG/ML injection (MULTI-DOSE) 100 mL (100 mL Intravenous Given 7/15/20 1448)       Diagnostic Studies  Results Reviewed     Procedure Component Value Units Date/Time    Platelet count [[id number]] Collected:  07/15/20 1937    Lab Status:   In process Specimen:  Blood from Arm, Left Updated:  07/15/20 1942    UA w Reflex to Microscopic w Reflex to Culture [[id number]]     Lab Status:  No result Specimen:  Urine     Basic metabolic panel [[id number]]  (Abnormal) Collected:  07/15/20 1307    Lab Status:  Final result Specimen:  Blood from Arm, Right Updated:  07/15/20 1330     Sodium 142 mmol/L      Potassium 4 3 mmol/L      Chloride 111 mmol/L      CO2 25 mmol/L      ANION GAP 6 mmol/L      BUN 15 mg/dL      Creatinine 0 84 mg/dL      Glucose 90 mg/dL      Calcium 10 1 mg/dL      eGFR 66 ml/min/1 73sq m     Narrative:       [place] guidelines for Chronic Kidney Disease (CKD):     Stage 1 with normal or high GFR (GFR > 90 mL/min/1 73 square meters)    Stage 2 Mild CKD (GFR = 60-89 mL/min/1 73 square meters)    Stage 3A Moderate CKD (GFR = 45-59 mL/min/1 73 square meters)    Stage 3B Moderate CKD (GFR = 30-44 mL/min/1 73 square meters)    Stage 4 Severe CKD (GFR = 15-29 mL/min/1 73 square meters)    Stage 5 End Stage CKD (GFR <15 mL/min/1 73 square meters)  Note: GFR calculation is accurate only with a steady state creatinine    CBC and differential [[id number]]  (Abnormal) Collected:  07/15/20 1307    Lab Status:  Final result Specimen:  Blood from Arm, Right Updated:  07/15/20 1316     WBC 8 26 Thousand/uL      RBC 5 41 Million/uL      Hemoglobin 16 3 g/dL      Hematocrit 49 9 %      MCV 92 fL      MCH 30 1 pg      MCHC 32 7 g/dL      RDW 13 8 %      MPV 9 8 fL      Platelets 874 Thousands/uL      nRBC 0 /100 WBCs      Neutrophils Relative 70 %      Immat GRANS % 0 %      Lymphocytes Relative 20 %      Monocytes Relative 9 %      Eosinophils Relative 1 %      Basophils Relative 0 %      Neutrophils Absolute 5 77 Thousands/µL      Immature Grans Absolute 0 03 Thousand/uL      Lymphocytes Absolute 1 62 Thousands/µL      Monocytes Absolute 0 76 Thousand/µL      Eosinophils Absolute 0 05 Thousand/µL      Basophils Absolute 0 03 Thousands/µL                  CT head without contrast   Final Result by Brittney Waller MD (07/15 1454)   1  No acute intracranial abnormality  Microangiopathic changes       2   Small right anterior scalp hematoma  Workstation performed: [id number]         CT chest abdomen pelvis w contrast   Final Result by Dayton Klinefelter, MD (07/15 1524)      1  Acute fractures of 6th through 10th left anterior lateral ribs with mild displacement of 7th and 8th fractures  No pneumothorax  2   No visceral injury within the chest, abdomen, pelvis  3   4 9 x 3 6 cm enhancing nodule within the right aspect of the lower uterine segment  Further gynecologic workup is recommended  4   Bladder wall submucosal hyperenhancement with mild perivesicular inflammatory change, which may indicate cystitis  Correlation with urinalysis recommended  The study was marked in [facility] for immediate notification  Workstation performed: [id number]               Procedures  Procedures      ED Course       US AUDIT      Most Recent Value   Initial Alcohol Screen: US AUDIT-C    1  How often do you have a drink containing alcohol?  0 Filed at: 07/15/2020 1231   2  How many drinks containing alcohol do you have on a typical day you are drinking? 0 Filed at: 07/15/2020 1231   3a  Male UNDER 65: How often do you have five or more drinks on one occasion? 0 Filed at: 07/15/2020 1231   3b  FEMALE Any Age, or MALE 65+: How often do you have 4 or more drinks on one occassion? 0 Filed at: 07/15/2020 1231   Audit-C Score  0 Filed at: 07/15/2020 1231                  ABDULAZIZ/DAST-10      Most Recent Value   How many times in the past year have you    Used an illegal drug or used a prescription medication for non-medical reasons? Never Filed at: 07/15/2020 1231                              MDM  Number of Diagnoses or Management Options  Diagnosis management comments: CT of head, chest/abdomen/pelvis was performed which redemonstrated the rib fractures with no additional findings  Patient was discussed with Trauma admitted to their service for further evaluation          Disposition  Final diagnoses:   Multiple rib fractures     Time reflects when diagnosis was documented in both MDM as applicable and the Disposition within this note     Time User Action Codes Description Comment    7/15/2020  7:46 PM Sejal Reed Add [O21 69MU] Multiple rib fractures       ED Disposition     ED Disposition Condition Date/Time Comment    Admit Stable Wed Jul 15, 2020  7:46 PM Case was discussed with trauma and the patient's admission status was agreed to be Admission Status: inpatient status to the service of Dr France Cancer   Follow-up Information    None         Patient's Medications   Discharge Prescriptions    No medications on file     No discharge procedures on file  PDMP Review     None           ED Provider  Attending physically available and evaluated Chuckie Elder  JESSICA managed the patient along with the ED Attending      Electronically Signed by         Perez Waterman MD  07/15/20 2715

## 2020-07-15 NOTE — ED ATTENDING ATTESTATION
7/15/2020  IMikey MD, saw and evaluated the patient  I have discussed the patient with the resident/non-physician practitioner and agree with the resident's/non-physician practitioner's findings, Plan of Care, and MDM as documented in the resident's/non-physician practitioner's note, except where noted  All available labs and Radiology studies were reviewed  I was present for key portions of any procedure(s) performed by the resident/non-physician practitioner and I was immediately available to provide assistance  At this point I agree with the current assessment done in the Emergency Department  I have conducted an independent evaluation of this patient a history and physical is as follows:   The patient suffered a fall on Sunday out of bed she injured her left rib area the patient undergoes physical therapy and was in no unable to participate because of severe pain in her left chest wall unable to lift her left arm because of the pain no complaints of shortness of breath no abdominal pain patient does take an aspirin she does not think she struck her head she has no complaints headache  Patient was seen at an outpatient urgent care center and had a chest x-ray done which revealed 5 broken ribs on the right with no obvious pneumothorax sent for evaluation to the emergency room the  Patient is awake she is able to follow simple commands no external evidence of head trauma  Neck nontender lungs clear chest wall is tender with bruising on left abdomen soft nontender pelvis is stable  The CT chest abdomen pelvis contact Trauma  Pain control  ED Course         Critical Care Time  Procedures

## 2020-07-15 NOTE — TELEPHONE ENCOUNTER
Dorita Herbert called to let us know that Jessica Baer is at her daughter Jo Ann Hernandez  and Jada Maddi they canceled Physical therapy for today going to go to Er or urgent care

## 2020-07-15 NOTE — H&P
H&P Exam - Trauma   Ray Maher 79 y o  female MRN: [id number]  Unit/Bed#: ED 02 Encounter: [id number]    Assessment/Plan   Trauma Alert: Evaluation  Model of Arrival: Self  Trauma Team: Attending Nghia Fire, Residents Parish Bell and SALTY [id number] [facility]  Consultants: None    Trauma Active Problems:     Acute fractures of left ribs 6 through 10  -evident on x-ray  -complaining of chest wall pain variable with respiration  -overlying contusion evident    Trauma Plan:   Admit to trauma floor  Multimodal pain control for rib fractures  PT/OT eval  Incentive spirometry  Commence diet  Continue home medications    Chief Complaint:  Chest wall pain    History of Present Illness   HPI:  Ray Maher is a 79 y o  female who presents with From her family doctor after having been seen for left chest wall pain secondary to a Fall on Sunday at home  During the fall, she denies loss of consciousness and states she tripped getting into bed and did bump her head  She is fully alert and oriented  She was prompted to seek care in the emergency department , where fractures of her left 6th through tenth ribs were identified radiographically  She notes only chest wall pain that varies with inspiration  She has no other complaints at this time and is conversant and lucid     Mechanism:Fall    Review of Systems   Respiratory:        Chest wall pain with respiration   Musculoskeletal:        Ambulatory issues, tripped frequently       12-point, complete review of systems was reviewed and negative except as stated above  Historical Information   History is unobtainable from the patient due to patient recollection    Efforts to obtain history included the following sources: family member, other medical personnel, obtained from other records    Past Medical History:   Diagnosis Date    Supranuclear palsy [facility])      Past Surgical History:   Procedure Laterality Date   [address] [place] [address]       Social History   Social History Substance and Sexual Activity   Alcohol Use Never    Frequency: Never     Social History     Substance and Sexual Activity   Drug Use Never     Social History     Tobacco Use   Smoking Status Never Smoker   Smokeless Tobacco Never Used     E-Cigarette/Vaping     E-Cigarette/Vaping Substances     Immunization History   Administered Date(s) Administered    INFLUENZA 11/14/2007, 11/03/2008, 12/03/2009, 11/09/2010, 10/19/2011, 09/12/2012, 11/20/2013, 09/29/2014, 01/02/2015, 10/09/2017, 10/25/2018    Influenza Whole 12/03/2009    Pneumococcal Conjugate 13-Valent 04/15/2015    Pneumococcal Polysaccharide PPV23 10/19/2011    TD (adult) Preservative Free 09/04/2017    Tetanus, adsorbed 09/04/2017     Last Tetanus:  2017, prior immunization record  Family History: Non-contributory  Unable to obtain/limited by not limited      Meds/Allergies   all current active meds have been reviewed    Allergies   Allergen Reactions    Procaine Anaphylaxis and Other (See Comments)     Got a really sore mouth, long ago  Got a really sore mouth, long ago      Penicillins Other (See Comments)    Sulfa Antibiotics     Sulfacetamide          PHYSICAL EXAM    PE limited by:  Not limited    Objective   Vitals:   First set: Temperature: 98 2 °F (36 8 °C) (07/15/20 1218)  Pulse: 82 (07/15/20 1218)  Respirations: 18 (07/15/20 1218)  Blood Pressure: 159/88 (07/15/20 1218)    Primary Survey:   (A) Airway:  Intact, protecting airway  (B) Breathing:  Normal effort, no accessory muscle use, lungs clear bilaterally  (C) Circulation: Pulses:   normal  (D) Disabliity:  GCS Total:  15, Eye Opening:   Spontaneous = 4, Motor Response: Obeys commands = 6 and Verbal Response:  Oriented = 5  (E) Expose:  Completed    Secondary Survey: (Click on Physical Exam tab above)  Physical Exam   Constitutional: She is oriented to person, place, and time  She appears well-developed and well-nourished  No distress  HENT:   Head: Normocephalic and atraumatic  Eyes: Pupils are equal, round, and reactive to light  Conjunctivae and EOM are normal    Neck: Normal range of motion  Neck supple  Cardiovascular: Normal rate, regular rhythm, normal heart sounds and intact distal pulses  No murmur heard  Pulmonary/Chest: Effort normal and breath sounds normal  She exhibits tenderness  Abdominal: Soft  Bowel sounds are normal  She exhibits no distension  There is no tenderness  Musculoskeletal: Normal range of motion  She exhibits no deformity  Right shoulder: She exhibits no deformity and no laceration  Neurological: She is alert and oriented to person, place, and time  Coordination abnormal    Patient has history of severe in clear palsy, she is slow to verbally relate things but fully oriented, obeys commands, and has good motor function  There appeared to be some minor coordination deficits likely attributable to this   Skin: Skin is warm and dry  Capillary refill takes less than 2 seconds  Psychiatric: She has a normal mood and affect  Her speech is delayed  Invasive Devices     Peripheral Intravenous Line            Peripheral IV 07/15/20 Right Antecubital less than 1 day                Lab Results:   BMP/CMP:   Lab Results   Component Value Date    SODIUM 142 07/15/2020    K 4 3 07/15/2020     (H) 07/15/2020    CO2 25 07/15/2020    BUN 15 07/15/2020    CREATININE 0 84 07/15/2020    CALCIUM 10 1 07/15/2020    EGFR 66 07/15/2020   , CBC:   Lab Results   Component Value Date    WBC 8 26 07/15/2020    HGB 16 3 (H) 07/15/2020    HCT 49 9 (H) 07/15/2020    MCV 92 07/15/2020     07/15/2020    MCH 30 1 07/15/2020    MCHC 32 7 07/15/2020    RDW 13 8 07/15/2020    MPV 9 8 07/15/2020    NRBC 0 07/15/2020    and Coagulation: No results found for: PT, INR, APTT  Imaging/EKG Studies: Xr Ribs Left W Pa Chest Min 3 Views    Result Date: 7/15/2020  Impression: Acute fractures involving the left lateral 6th through 10th ribs, as described above  No definite pneumothorax identified on the current exam  Workstation performed: [id number]     Xr Ribs Left W Pa Chest Min 3 Views    Result Date: 7/15/2020  Impression: Acute fractures involving the left lateral 6th through 10th ribs, as described above  No definite pneumothorax identified on the current exam  Workstation performed: [id number]     Ct Head Without Contrast    Result Date: 7/15/2020  Impression: 1  No acute intracranial abnormality  Microangiopathic changes  2   Small right anterior scalp hematoma  Workstation performed: [id number]     Ct Chest Abdomen Pelvis W Contrast    Result Date: 7/15/2020  Impression: 1  Acute fractures of 6th through 10th left anterior lateral ribs with mild displacement of 7th and 8th fractures  No pneumothorax  2   No visceral injury within the chest, abdomen, pelvis  3   4 9 x 3 6 cm enhancing nodule within the right aspect of the lower uterine segment  Further gynecologic workup is recommended  4   Bladder wall submucosal hyperenhancement with mild perivesicular inflammatory change, which may indicate cystitis  Correlation with urinalysis recommended  The study was marked in [facility] for immediate notification   Workstation performed: [id number]       Other Studies:  CT scans of head, chest abdomen pelvis pending    Code Status: No Order  Advance Directive and Living Will:      Power of :    POLST:

## 2020-07-16 PROBLEM — S22.42XA CLOSED FRACTURE OF MULTIPLE RIBS OF LEFT SIDE: Status: ACTIVE | Noted: 2020-07-16

## 2020-07-16 LAB
ANION GAP SERPL CALCULATED.3IONS-SCNC: 5 MMOL/L (ref 4–13)
ATRIAL RATE: 77 BPM
BACTERIA UR QL AUTO: ABNORMAL /HPF
BILIRUB UR QL STRIP: NEGATIVE
BUN SERPL-MCNC: 14 MG/DL (ref 5–25)
CALCIUM SERPL-MCNC: 9.2 MG/DL (ref 8.3–10.1)
CHLORIDE SERPL-SCNC: 113 MMOL/L (ref 100–108)
CLARITY UR: ABNORMAL
CO2 SERPL-SCNC: 26 MMOL/L (ref 21–32)
COLOR UR: YELLOW
CREAT SERPL-MCNC: 0.69 MG/DL (ref 0.6–1.3)
ERYTHROCYTE [DISTWIDTH] IN BLOOD BY AUTOMATED COUNT: 13.7 % (ref 11.6–15.1)
GFR SERPL CREATININE-BSD FRML MDRD: 83 ML/MIN/1.73SQ M
GLUCOSE SERPL-MCNC: 74 MG/DL (ref 65–140)
GLUCOSE UR STRIP-MCNC: NEGATIVE MG/DL
HCT VFR BLD AUTO: 44.3 % (ref 34.8–46.1)
HGB BLD-MCNC: 14.4 G/DL (ref 11.5–15.4)
HGB UR QL STRIP.AUTO: ABNORMAL
HYALINE CASTS #/AREA URNS LPF: ABNORMAL /LPF
KETONES UR STRIP-MCNC: NEGATIVE MG/DL
LEUKOCYTE ESTERASE UR QL STRIP: ABNORMAL
MCH RBC QN AUTO: 30.2 PG (ref 26.8–34.3)
MCHC RBC AUTO-ENTMCNC: 32.5 G/DL (ref 31.4–37.4)
MCV RBC AUTO: 93 FL (ref 82–98)
NITRITE UR QL STRIP: NEGATIVE
NON-SQ EPI CELLS URNS QL MICRO: ABNORMAL /HPF
P AXIS: 13 DEGREES
PH UR STRIP.AUTO: 7.5 [PH]
PLATELET # BLD AUTO: 211 THOUSANDS/UL (ref 149–390)
PMV BLD AUTO: 10.5 FL (ref 8.9–12.7)
POTASSIUM SERPL-SCNC: 3.6 MMOL/L (ref 3.5–5.3)
PR INTERVAL: 140 MS
PROT UR STRIP-MCNC: ABNORMAL MG/DL
QRS AXIS: 57 DEGREES
QRSD INTERVAL: 88 MS
QT INTERVAL: 370 MS
QTC INTERVAL: 418 MS
RBC # BLD AUTO: 4.77 MILLION/UL (ref 3.81–5.12)
RBC #/AREA URNS AUTO: ABNORMAL /HPF
SODIUM SERPL-SCNC: 144 MMOL/L (ref 136–145)
SP GR UR STRIP.AUTO: 1.02 (ref 1–1.03)
T WAVE AXIS: 24 DEGREES
UROBILINOGEN UR QL STRIP.AUTO: 0.2 E.U./DL
VENTRICULAR RATE: 77 BPM
WBC # BLD AUTO: 7.54 THOUSAND/UL (ref 4.31–10.16)
WBC #/AREA URNS AUTO: ABNORMAL /HPF

## 2020-07-16 PROCEDURE — 93010 ELECTROCARDIOGRAM REPORT: CPT | Performed by: INTERNAL MEDICINE

## 2020-07-16 PROCEDURE — 85027 COMPLETE CBC AUTOMATED: CPT | Performed by: SURGERY

## 2020-07-16 PROCEDURE — 97163 PT EVAL HIGH COMPLEX 45 MIN: CPT

## 2020-07-16 PROCEDURE — 87077 CULTURE AEROBIC IDENTIFY: CPT | Performed by: ORTHOPAEDIC SURGERY

## 2020-07-16 PROCEDURE — 87086 URINE CULTURE/COLONY COUNT: CPT | Performed by: ORTHOPAEDIC SURGERY

## 2020-07-16 PROCEDURE — 81001 URINALYSIS AUTO W/SCOPE: CPT | Performed by: ORTHOPAEDIC SURGERY

## 2020-07-16 PROCEDURE — 99222 1ST HOSP IP/OBS MODERATE 55: CPT | Performed by: ANESTHESIOLOGY

## 2020-07-16 PROCEDURE — 87186 SC STD MICRODIL/AGAR DIL: CPT | Performed by: ORTHOPAEDIC SURGERY

## 2020-07-16 PROCEDURE — 99232 SBSQ HOSP IP/OBS MODERATE 35: CPT | Performed by: SURGERY

## 2020-07-16 PROCEDURE — 99222 1ST HOSP IP/OBS MODERATE 55: CPT | Performed by: INTERNAL MEDICINE

## 2020-07-16 PROCEDURE — 97167 OT EVAL HIGH COMPLEX 60 MIN: CPT

## 2020-07-16 PROCEDURE — 80048 BASIC METABOLIC PNL TOTAL CA: CPT | Performed by: SURGERY

## 2020-07-16 RX ORDER — METHOCARBAMOL 500 MG/1
500 TABLET, FILM COATED ORAL EVERY 8 HOURS SCHEDULED
Status: DISCONTINUED | OUTPATIENT
Start: 2020-07-16 | End: 2020-07-17

## 2020-07-16 RX ORDER — ACETAMINOPHEN 325 MG/1
975 TABLET ORAL EVERY 8 HOURS SCHEDULED
Status: DISCONTINUED | OUTPATIENT
Start: 2020-07-16 | End: 2020-07-17 | Stop reason: HOSPADM

## 2020-07-16 RX ADMIN — OXYBUTYNIN 10 MG: 10 TABLET, FILM COATED, EXTENDED RELEASE ORAL at 21:25

## 2020-07-16 RX ADMIN — ASPIRIN 81 MG 81 MG: 81 TABLET ORAL at 08:24

## 2020-07-16 RX ADMIN — OXYCODONE HYDROCHLORIDE 2.5 MG: 5 TABLET ORAL at 00:55

## 2020-07-16 RX ADMIN — METHOCARBAMOL TABLETS 500 MG: 500 TABLET, COATED ORAL at 15:39

## 2020-07-16 RX ADMIN — LIDOCAINE 1 PATCH: 50 PATCH TOPICAL at 08:24

## 2020-07-16 RX ADMIN — ACETAMINOPHEN 975 MG: 325 TABLET, FILM COATED ORAL at 21:25

## 2020-07-16 RX ADMIN — ACETAMINOPHEN 975 MG: 325 TABLET, FILM COATED ORAL at 15:39

## 2020-07-16 RX ADMIN — DOCUSATE SODIUM AND SENNOSIDES 1 TABLET: 8.6; 5 TABLET ORAL at 08:24

## 2020-07-16 RX ADMIN — ENOXAPARIN SODIUM 30 MG: 30 INJECTION SUBCUTANEOUS at 17:03

## 2020-07-16 RX ADMIN — OXYCODONE HYDROCHLORIDE 2.5 MG: 5 TABLET ORAL at 09:13

## 2020-07-16 RX ADMIN — METHOCARBAMOL TABLETS 500 MG: 500 TABLET, COATED ORAL at 21:25

## 2020-07-16 RX ADMIN — ENOXAPARIN SODIUM 30 MG: 30 INJECTION SUBCUTANEOUS at 09:13

## 2020-07-16 NOTE — PLAN OF CARE
Problem: Potential for Falls  Goal: Patient will remain free of falls  Description  INTERVENTIONS:  - Assess patient frequently for physical needs  -  Identify cognitive and physical deficits and behaviors that affect risk of falls    -  [place] fall precautions as indicated by assessment   - Educate patient/family on patient safety including physical limitations  - Instruct patient to call for assistance with activity based on assessment  - Modify environment to reduce risk of injury  - Consider OT/PT consult to assist with strengthening/mobility  Outcome: Progressing     Problem: Prexisting or High Potential for Compromised Skin Integrity  Goal: Skin integrity is maintained or improved  Description  INTERVENTIONS:  - Identify patients at risk for skin breakdown  - Assess and monitor skin integrity  - Assess and monitor nutrition and hydration status  - Monitor labs   - Assess for incontinence   - Turn and reposition patient  - Assist with mobility/ambulation  - Relieve pressure over bony prominences  - Avoid friction and shearing  - Provide appropriate hygiene as needed including keeping skin clean and dry  - Evaluate need for skin moisturizer/barrier cream  - Collaborate with interdisciplinary team   - Patient/family teaching  - Consider wound care consult   Outcome: Progressing     Problem: PAIN - ADULT  Goal: Verbalizes/displays adequate comfort level or baseline comfort level  Description  Interventions:  - Encourage patient to monitor pain and request assistance  - Assess pain using appropriate pain scale  - Administer analgesics based on type and severity of pain and evaluate response  - Implement non-pharmacological measures as appropriate and evaluate response  - Consider cultural and social influences on pain and pain management  - Notify physician/advanced practitioner if interventions unsuccessful or patient reports new pain  Outcome: Progressing     Problem: INFECTION - ADULT  Goal: Absence or prevention of progression during hospitalization  Description  INTERVENTIONS:  - Assess and monitor for signs and symptoms of infection  - Monitor lab/diagnostic results  - Monitor all insertion sites, i e  indwelling lines, tubes, and drains  - Monitor endotracheal if appropriate and nasal secretions for changes in amount and color  - [place] appropriate cooling/warming therapies per order  - Administer medications as ordered  - Instruct and encourage patient and family to use good hand hygiene technique  - Identify and instruct in appropriate isolation precautions for identified infection/condition  Outcome: Progressing  Goal: Absence of fever/infection during neutropenic period  Description  INTERVENTIONS:  - Monitor WBC    Outcome: Progressing     Problem: SAFETY ADULT  Goal: Maintain or return to baseline ADL function  Description  INTERVENTIONS:  -  Assess patient's ability to carry out ADLs; assess patient's baseline for ADL function and identify physical deficits which impact ability to perform ADLs (bathing, care of mouth/teeth, toileting, grooming, dressing, etc )  - Assess/evaluate cause of self-care deficits   - Assess range of motion  - Assess patient's mobility; develop plan if impaired  - Assess patient's need for assistive devices and provide as appropriate  - Encourage maximum independence but intervene and supervise when necessary  - Involve family in performance of ADLs  - Assess for home care needs following discharge   - Consider OT consult to assist with ADL evaluation and planning for discharge  - Provide patient education as appropriate  Outcome: Progressing  Goal: Maintain or return mobility status to optimal level  Description  INTERVENTIONS:  - Assess patient's baseline mobility status (ambulation, transfers, stairs, etc )    - Identify cognitive and physical deficits and behaviors that affect mobility  - Identify mobility aids required to assist with transfers and/or ambulation (gait belt, sit-to-stand, lift, walker, cane, etc )  - [place] fall precautions as indicated by assessment  - Record patient progress and toleration of activity level on Mobility SBAR; progress patient to next Phase/Stage  - Instruct patient to call for assistance with activity based on assessment  - Consider rehabilitation consult to assist with strengthening/weightbearing, etc   Outcome: Progressing     Problem: DISCHARGE PLANNING  Goal: Discharge to home or other facility with appropriate resources  Description  INTERVENTIONS:  - Identify barriers to discharge w/patient and caregiver  - Arrange for needed discharge resources and transportation as appropriate  - Identify discharge learning needs (meds, wound care, etc )  - Arrange for interpretive services to assist at discharge as needed  - Refer to Case Management Department for coordinating discharge planning if the patient needs post-hospital services based on physician/advanced practitioner order or complex needs related to functional status, cognitive ability, or social support system  Outcome: Progressing     Problem: Knowledge Deficit  Goal: Patient/family/caregiver demonstrates understanding of disease process, treatment plan, medications, and discharge instructions  Description  Complete learning assessment and assess knowledge base    Interventions:  - Provide teaching at level of understanding  - Provide teaching via preferred learning methods  Outcome: Progressing

## 2020-07-16 NOTE — PLAN OF CARE
Problem: OCCUPATIONAL THERAPY ADULT  Goal: Performs self-care activities at highest level of function for planned discharge setting  See evaluation for individualized goals  Description  Treatment Interventions: ADL retraining, Functional transfer training, Endurance training, Cognitive reorientation, Patient/family training, Fine motor coordination activities, Compensatory technique education, Energy conservation, Activityengagement          See flowsheet documentation for full assessment, interventions and recommendations  Note:   Limitation: Decreased ADL status, Decreased Safe judgement during ADL, Decreased cognition, Decreased endurance, Decreased self-care trans, Decreased high-level ADLs  Prognosis: Fair  Assessment: Pt is a 78 y/o F admitted to [facility] on 7/16/20 post fall Sunday, dx of L 6-10 fx  Pt PMH of  Supranuclear Palsy and history of CVA  Active OT orders and OOB orders  Pt with cognitive impairments and speech deficits limited secondary to Supranuclear Palsy  PTA, pt reports receiving help from  and DTR in ADLs and IADLs  Pt reports using RW for short distances, and uses a manual w/c for functional mobility  Pt reports living in a 2 SH with  and DTR  Pt is currently Min A for transfers and functional mobility and required Modx2 for bed mobility with LE management  Pt is currently Mod A for UB ADLs and Max A for LB ADLs  Pt is limited by activity tolerance, pain, endurance and cognitive impairments  These impair baseline function in grooming, bathing, toilet hygiene, dressing and functional mobility  PT educated on safe transfers to use hand rails of chair when sitting down  From OT standpoint, anticipate d/c to home with home therapy pending family availability  If family unable to provide current level of assist, recommend short term rehab  OT will follow 3-5x/wk in the next 10-14 days in order to meet following goals        OT Discharge Recommendation: (See assement above)  OT - OK to Discharge:  Yes

## 2020-07-16 NOTE — PLAN OF CARE
Problem: PHYSICAL THERAPY ADULT  Goal: Performs mobility at highest level of function for planned discharge setting  See evaluation for individualized goals  Description  Treatment/Interventions: Functional transfer training, LE strengthening/ROM, Therapeutic exercise, Endurance training, Cognitive reorientation, Patient/family training, Equipment eval/education, Bed mobility, Gait training          See flowsheet documentation for full assessment, interventions and recommendations  Note:   Prognosis: Good  Problem List: Decreased strength, Decreased endurance, Impaired balance, Decreased mobility, Decreased cognition, Decreased safety awareness, Pain  Assessment: Pt is a 79 y o  female seen for PT evaluation s/p admit to [facility] on 7/15/2020  Pt was admitted with a primary dx of: fall out of bed resulting in closed fx of L ribs 6-10  PT now consulted for assessment of mobility and d/c needs  Pt with Up and OOB as tolerated  orders  Pts current comorbidities effecting treatment include: Supranuclear palsy, hip fx, CVA  Pts current clinical presentation is Unstable/ Unpredictable (high complexity) due to Ongoing medical management for primary dx, Decreased activity tolerance compared to baseline, Fall risk, Increased assistance needed from caregiver at current time, Cog status  Prior to admission, pt resides with family, requires assistance for ADL's, pt reports she ambulates short distances with RW  Upon evaluation, pt currently is requiring modA +2 for bed mobility; Sadi for transfers and Sadi for ambulation 4 ft w/ RW  Pt ataxic, bradykinetic which appears to be baseline, 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    Pt presents at PT eval functioning below baseline and currently w/ overall mobility deficits 2* to: BLE weakness, impaired balance, decreased endurance, gait deviations, pain, decreased activity tolerance compared to baseline, decreased functional mobility tolerance compared to baseline, decreased safety awareness, fall risk, decreased cognition  Pt currently at a fall risk 2* to impairments listed above  Pt will continue to benefit from skilled acute PT interventions to address stated impairments; to maximize functional mobility; for ongoing pt/ family training; and DME needs  At conclusion of PT session pt returned back in chair and chair alarm engaged with phone and call bell within reach  Pt denies any further questions at this time  Recommend home with family care and HHPT upon hospital D/C pending confirmation of family ability to provide assistance  PT Discharge Recommendation: Home with skilled therapy(home with family care and HHPT)     PT - OK to Discharge: Yes(pending confirmation family able to provide assistance)    See flowsheet documentation for full assessment

## 2020-07-16 NOTE — CONSULTS
Consultation - Acute Pain Service   Martha Smith 79 y o  female MRN: [id number]  Unit/Bed#: [facility] 823-01 Encounter: [id number]               Assessment/Plan     Assessment:   Patient Active Problem List   Diagnosis    Supranuclear palsy ([address] )    [address]erebrovascular [address] [address] )    Closed fracture of multiple ribs of left side      80 yo woman S/P fall, with left sided ribs 6-10  Unable to get a good history from the patient, and unclear what her pain level is  Plan:   As per the nurse, the patient's pain seems to be well controlled  When they moved her and bathed her she was not c/o too much pain  IS gets up to 500cc  For now we will hold off on an epidural and see how she does today  We can re-evaluate tomorrow  APS will continue to follow; please contact APS ( btn [phone]) with any further questions    History of Present Illness    Admit Date:  7/15/2020  Hospital Day:  0 days  Primary Service:  Trauma  Attending Provider:  Roxy Sevilla MD  Reason for Consult / Principal Problem:   HPI: Martha Smith is a 79y o  year old female who presents with rib fractures    Current pain location(s): left ribs  Pain Scale:   Unable to quantify  Quality: sharp with inspration  Current Analgesic regimen:  5mg oxycodone    Pain History: new acute pain after fall  Pain Management Provider:  none    I have reviewed the patient's controlled substance dispensing history in the Prescription Drug Monitoring Program in compliance with the [facility] regulations before prescribing any controlled substances  Inpatient consult to Acute Pain Service (see Comments)  Consult performed by: Chrissy Solomon MD  Consult ordered by: Gunner Hill MD        Review of Systems Unable to obtain from patient      Historical Information   Past Medical History:   Diagnosis Date    Supranuclear palsy [facility])      Past Surgical History:   Procedure Laterality Date    BACK SURGERY      HIP FRACTURE SURGERY       Social History Social History     Substance and Sexual Activity   Alcohol Use Not Currently    Frequency: Never     Social History     Substance and Sexual Activity   Drug Use Never     Social History     Tobacco Use   Smoking Status Never Smoker   Smokeless Tobacco Never Used     Family History: non-contributory    Meds/Allergies   current meds:   Current Facility-Administered Medications   Medication Dose Route Frequency    acetaminophen (TYLENOL) tablet 975 mg  975 mg Oral Q8H [address]    aspirin chewable tablet 81 mg  81 mg Oral Daily    enoxaparin (LOVENOX) subcutaneous injection 30 mg  30 mg Subcutaneous BID    HYDROmorphone (DILAUDID) injection 0 2 mg  0 2 mg Intravenous Q4H PRN    lidocaine (LIDODERM) 5 % patch 1 patch  1 patch Topical Daily    methocarbamol (ROBAXIN) tablet 500 mg  500 mg Oral Q8H [address]    oxybutynin (DITROPAN-XL) 24 hr tablet 10 mg  10 mg Oral HS    oxyCODONE (ROXICODONE) IR tablet 2 5 mg  2 5 mg Oral Q4H PRN    senna-docusate sodium (SENOKOT S) 8 6-50 mg per tablet 1 tablet  1 tablet Oral Daily       Allergies   Allergen Reactions    Procaine Anaphylaxis and Other (See Comments)     Got a really sore mouth, long ago  Got a really sore mouth, long ago      Penicillins Other (See Comments)    Sulfa Antibiotics     Sulfacetamide        Objective   Temp:  [98 °F (36 7 °C)-98 8 °F (37 1 °C)] 98 °F (36 7 °C)  HR:  [66-71] 66  Resp:  [16-19] 18  BP: (122-141)/(69-83) 134/76    Intake/Output Summary (Last 24 hours) at 7/16/2020 1319  Last data filed at 7/16/2020 0913  Gross per 24 hour   Intake 300 ml   Output    Net 300 ml       Physical Exam   Constitutional: She is oriented to person, place, and time  She appears well-developed and well-nourished  HENT:   Head: Normocephalic  Eyes: Pupils are equal, round, and reactive to light  Cardiovascular: Normal rate, regular rhythm and normal heart sounds  Pulmonary/Chest:   Decreased respiratory effort  Mild splinting     Abdominal: Bowel sounds are normal    Neurological: She is alert and oriented to person, place, and time  Psychiatric: She has a normal mood and affect  Her behavior is normal        Lab Results: I have personally reviewed pertinent labs  Imaging Studies: I have personally reviewed pertinent reports  EKG, Pathology, and Other Studies: I have personally reviewed pertinent reports  Counseling / Coordination of Care  Total floor / unit time spent today Level 2 = 40 minutes  Greater than 50% of total time was spent with the patient and / or family counseling and / or coordination of care      Arron Kelly MD

## 2020-07-16 NOTE — UTILIZATION REVIEW
Initial Clinical Review    OBSERVATION 7/15/20 @ 1622 - CHANGED TO INPATIENT ON 7/16/20 @ 1413    Inpatient Admission Orders (From admission, onward)     Ordered        07/16/20 1413  Inpatient Admission  Once                   Orders Placed This Encounter   Procedures    Inpatient Admission     Standing Status:   Standing     Number of Occurrences:   1     Order Specific Question:   Admitting Physician     Answer:   Virgen Alberto [[id number]]     Order Specific Question:   Level of Care     Answer:   Med Surg [16]     Order Specific Question:   Bed Type     Answer:   Trauma [7]     Order Specific Question:   Estimated length of stay     Answer:   More than 2 Midnights     Order Specific Question:   Certification     Answer:   I certify that inpatient services are medically necessary for this patient for a duration of greater than two midnights  See H&P and MD Progress Notes for additional information about the patient's course of treatment  ED Arrival Information     Expected Arrival Acuity Means of Arrival Escorted By Service Admission Type    - 7/15/2020 12:07 Urgent Walk-In Self Trauma Urgent    Arrival Complaint    Fall        Chief Complaint   Patient presents with    Fall     pt fell sunday out of bed, denies head strike, was told from urgent care 5 broken ribs on the left side     Assessment/Plan:   Ms Bree Pete is a 78 yo female who presents to the ED for trauma eval fromPCP office with c/o L chest wall pain that worsens with inspiration r/t fall on 7/12 with + head strike and no LOC while tripping getting into bed  She is A&O  She was found to have fractures of L 6th through 10th ribs  She is admitted to OBSERVATION status with Acute fractures of L ribs 6 through 10 - multimodal pain plan, Incentive Spirometry, PT/OT eval       7/16 = Anesthesis Consult - pulling  cc  Holding off on epidural at this time  Pain fairly well controlled  7/16 Gerontology consult - lives with family   Some dementia  Pain control and normalization of sleep schedule  Provide pain control with geriatric dosing  Recommend toileting schedule, early mobilization  ED Triage Vitals [07/15/20 1218]   Temperature Pulse Respirations Blood Pressure SpO2   98 2 °F (36 8 °C) 82 18 159/88 95 %      Temp Source Heart Rate Source Patient Position - Orthostatic VS BP Location FiO2 (%)   Oral Monitor Lying Right arm --      Pain Score       9        Wt Readings from Last 1 Encounters:   07/15/20 75 8 kg (167 lb)     Additional Vital Signs:     07/16/20 06:49:02  98 °F (36 7 °C)  66  18  134/76  95  96 %  None (Room air)  Lying   07/15/20 22:20:40  98 7 °F (37 1 °C)  67  18  122/81  95  95 %       07/15/20 2200              None (Room air)     07/15/20 20:36:20  98 8 °F (37 1 °C)  70  19  122/83  96  95 %       07/15/20 1913    71  18  134/69    95 %  None (Room air)  Sitting   07/15/20 1500    68  18  141/83  107  95 %  None (Room air)  Lying     Pertinent Labs/Diagnostic Test Results:     7/15 Xray L ribs and CXR - Acute fractures involving the left lateral 6th through 10th ribs, as described above  No definite pneumothorax identified on the current exam     7/15 CT head - 1  No acute intracranial abnormality  Microangiopathic changes  2   Small right anterior scalp hematoma  7/15 CT chest, abd, pelvis - 1  Acute fractures of 6th through 10th left anterior lateral ribs with mild displacement of 7th and 8th fractures  No pneumothorax  2   No visceral injury within the chest, abdomen, pelvis  3   4 9 x 3 6 cm enhancing nodule within the right aspect of the lower uterine segment  Further gynecologic workup is recommended  4   Bladder wall submucosal hyperenhancement with mild perivesicular inflammatory change, which may indicate cystitis  Correlation with urinalysis recommended        Results from last 7 days   Lab Units 07/16/20  0451 07/15/20  1937 07/15/20  1307   WBC Thousand/uL 7 54  --  8 26 HEMOGLOBIN g/dL 14 4  --  16 3*   HEMATOCRIT % 44 3  --  49 9*   PLATELETS Thousands/uL 211 235 228   NEUTROS ABS Thousands/µL  --   --  5 77         Results from last 7 days   Lab Units 07/16/20  0451 07/15/20  1307   SODIUM mmol/L 144 142   POTASSIUM mmol/L 3 6 4 3   CHLORIDE mmol/L 113* 111*   CO2 mmol/L 26 25   ANION GAP mmol/L 5 6   BUN mg/dL 14 15   CREATININE mg/dL 0 69 0 84   EGFR ml/min/1 73sq m 83 66   CALCIUM mg/dL 9 2 10 1     Results from last 7 days   Lab Units 07/16/20  0451 07/15/20  1307   GLUCOSE RANDOM mg/dL 74 90     ED Treatment:   Medication Administration from 07/15/2020 1207 to 07/15/2020 2032    Date/Time Order Dose Route Action   07/15/2020 1252 oxyCODONE (ROXICODONE) IR tablet 2 5 mg 2 5 mg Oral Given   07/15/2020 1448 iohexol (OMNIPAQUE) 350 MG/ML injection (MULTI-DOSE) 100 mL 100 mL Intravenous Given   07/15/2020 1703 oxyCODONE (ROXICODONE) IR tablet 2 5 mg 2 5 mg Oral Given        Past Medical History:   Diagnosis Date    Supranuclear palsy ([facility] [address] )      Admitting Diagnosis: Multiple rib fractures [S22 49XA]  Unspecified multiple injuries, initial encounter [T07  XXXA]     Age/Sex: 79 y o  female     Admission Orders:  Scheduled Medications:    Medications:  aspirin 81 mg Oral Daily   enoxaparin 30 mg Subcutaneous BID   lidocaine 1 patch Topical Daily   oxybutynin 10 mg Oral HS   senna-docusate sodium 1 tablet Oral Daily     Continuous IV Infusions:     PRN Meds:    HYDROmorphone 0 2 mg Intravenous Q4H PRN    oxyCODONE 2 5 mg Oral Q4H PRN x1 7/15, x2 7/16     SCDs  OOB to chair q shift   Oxygen via NC @ 2L   Continuous pulse ox  IP CONSULT TO CASE MANAGEMENT  IP CONSULT TO ACUTE PAIN SERVICE    Network Utilization Review Department  [email or web address]  ATTENTION: Please call with any questions or concerns to [phone] and carefully listen to the prompts so that you are directed to the right person   All voicemails are confidential   Ilana Obi all requests for admission clinical reviews, approved or denied determinations and any other requests to dedicated fax number below belonging to the campus where the patient is receiving treatment   List of dedicated fax numbers for the Facilities:  [address] [place] [address] DENIALS (Administrative/Medical Necessity) [phone]   [address] (Maternity/NICU/Pediatrics) [phone]   Rowan Fernández [phone]   Kristi Garcia [phone]   Marla Gallardo [phone]   [facility] [phone]   [address] [phone]   [facility]  [phone]   [address] [facility]  [address] [facility] [address] [facility] [phone]

## 2020-07-16 NOTE — PHYSICAL THERAPY NOTE
Physical Therapy Evaluation    Patient's Name: Az Lewis    Admitting Diagnosis  Multiple rib fractures [S22 49XA]  Unspecified multiple injuries, initial encounter [T07  XXXA]    Problem List  Patient Active Problem List   Diagnosis    [facility]ranuclear palsy ([facility] [address] )    Cerebrovascular accident ([facility] [address] )    Closed fracture of multiple ribs of left side       Past Medical History  Past Medical History:   Diagnosis Date    Supranuclear palsy [facility])        Past Surgical History  Past Surgical History:   Procedure Laterality Date    BACK SURGERY      HIP FRACTURE SURGERY          07/16/20 1142   Note Type   Note type Eval only   Pain Assessment   Pain Assessment Tool 0-10   Pain Score 5   Pain Location/Orientation Orientation: Left; Location: Rib Cage   Hospital Pain Intervention(s) Repositioned; Ambulation/increased activity   Home Living   Type of [facility] [address]; Wheelchair-manual   Prior Function   Level of [place] Needs assistance with ADLs and functional mobility   Lives With Daughter   Receives Help From Family   ADL Assistance Needs assistance   IADLs Needs assistance   Falls in the last 6 months 1 to 4   Comments Pt reports uses RW for short distance ambulation and W/C for mobility  Pt reports no other recent falls  Reports has been working with HHPT to improve ability to cook meals  Restrictions/Precautions   Weight Bearing Precautions Per Order No   Other Precautions Cognitive; Chair Alarm; Bed Alarm; Fall Risk;Pain   Cognition   Overall Cognitive Status Impaired   Arousal/Participation Alert   Orientation Level Oriented to person;Oriented to place;Oriented to situation   Memory Decreased short term memory;Decreased recall of precautions   Following Commands Follows one step commands with increased time or repetition   Comments Pt bradykinetic, requires increased time for processing and initation of movement       RLE Assessment   RLE Assessment WFL  (Grossly 3+/5)   LLE Assessment LLE Assessment WFL  (Grossly 3+/5)   Light Touch   RLE Light Touch Grossly intact   LLE Light Touch Grossly intact   Bed Mobility   Supine to Sit 3  Moderate assistance   Additional items Assist x 2; Increased time required;Verbal cues   Additional Comments +2 assistance for LE managment and trunk control into sitting  Once sitting EOB, able to maintain sitting balance with close supervision    Transfers   Sit to Stand 4  Minimal assistance   Additional items Assist x 1; Increased time required;Verbal cues   Stand to Sit 4  Minimal assistance   Additional items Assist x 1; Increased time required;Verbal cues   Additional Comments VC's for hand placement    Ambulation/Elevation   Gait pattern Ataxia; Short stride; Excessively slow   Gait Assistance 4  Minimal assist   Additional items Assist x 1   Assistive Device Rolling walker   Distance 4 ft to chair   Balance   Static Sitting Fair -   Dynamic Sitting Poor   Static Standing Poor +   Dynamic Standing Poor +   Ambulatory Poor +   Activity Tolerance   Activity Tolerance Patient limited by fatigue;Patient limited by pain   Medical Staff Made Aware OT, Chise   Nurse Made Aware RN updated  Chair alarm intact   Assessment   Prognosis Good   Problem List Decreased strength;Decreased endurance; Impaired balance;Decreased mobility; Decreased cognition;Decreased safety awareness;Pain   Assessment Pt is a 79 y o  female seen for PT evaluation s/p admit to [facility] on 7/15/2020  Pt was admitted with a primary dx of: fall out of bed resulting in closed fx of L ribs 6-10  PT now consulted for assessment of mobility and d/c needs  Pt with Up and OOB as tolerated  orders  Pts current comorbidities effecting treatment include: Supranuclear palsy, hip fx, CVA   Pts current clinical presentation is Unstable/ Unpredictable (high complexity) due to Ongoing medical management for primary dx, Decreased activity tolerance compared to baseline, Fall risk, Increased assistance needed from caregiver at current time, Cog status  Prior to admission, pt resides with family, requires assistance for ADL's, pt reports she ambulates short distances with RW  Upon evaluation, pt currently is requiring modA +2 for bed mobility; Sadi for transfers and Sadi for ambulation 4 ft w/ RW  Pt ataxic, bradykinetic which appears to be baseline, 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  Pt presents at PT eval functioning below baseline and currently w/ overall mobility deficits 2* to: BLE weakness, impaired balance, decreased endurance, gait deviations, pain, decreased activity tolerance compared to baseline, decreased functional mobility tolerance compared to baseline, decreased safety awareness, fall risk, decreased cognition  Pt currently at a fall risk 2* to impairments listed above  Pt will continue to benefit from skilled acute PT interventions to address stated impairments; to maximize functional mobility; for ongoing pt/ family training; and DME needs  At conclusion of PT session pt returned back in chair and chair alarm engaged with phone and call bell within reach  Pt denies any further questions at this time  Recommend home with family care and HHPT upon hospital D/C pending confirmation of family ability to provide assistance  Goals   Patient Goals "go home"   STG Expiration Date 07/26/20   Short Term Goal #1 In 10 days pt will be able to: 1  Demonstrate ability to perform all aspects of bed mobility with Sadi to increase functional independence  2  Perform functional transfers with supervision with RW to facilitate safe return to previous living environment  3   Ambulate 50 ft with RW and supervision with stable vitals to improve safety with household distances and reduce fall risk  4  Improve LE strength grades by 1 to increase ease of functional mobility with transfers and gait  5  Pt will demonstrate improved balance by one grade order to decrease risk of falls      PT Treatment Day 0   Plan Treatment/Interventions Functional transfer training;LE strengthening/ROM; Therapeutic exercise; Endurance training;Cognitive reorientation;Patient/family training;Equipment eval/education; Bed mobility;Gait training   PT Frequency Other (Comment)  (3-6x/week)   Recommendation   PT Discharge Recommendation Home with skilled therapy  (home with family care and HHPT)   PT - OK to Discharge Yes  (pending confirmation family able to provide assistance)   Barthel Index   Feeding 5   Bathing 0   Grooming Score 0   Dressing Score 5   Bladder Score 5   Bowels Score 5   Toilet Use Score 5   Transfers (Bed/Chair) Score 10   Mobility (Level Surface) Score 0   Stairs Score 0   Barthel Index Score 35       Colon Patience, PT, DPT

## 2020-07-16 NOTE — PLAN OF CARE
Problem: Potential for Falls  Goal: Patient will remain free of falls  Description  INTERVENTIONS:  - Assess patient frequently for physical needs  -  Identify cognitive and physical deficits and behaviors that affect risk of falls    -  [place] fall precautions as indicated by assessment   - Educate patient/family on patient safety including physical limitations  - Instruct patient to call for assistance with activity based on assessment  - Modify environment to reduce risk of injury  - Consider OT/PT consult to assist with strengthening/mobility  Outcome: Progressing     Problem: Prexisting or High Potential for Compromised Skin Integrity  Goal: Skin integrity is maintained or improved  Description  INTERVENTIONS:  - Identify patients at risk for skin breakdown  - Assess and monitor skin integrity  - Assess and monitor nutrition and hydration status  - Monitor labs   - Assess for incontinence   - Turn and reposition patient  - Assist with mobility/ambulation  - Relieve pressure over bony prominences  - Avoid friction and shearing  - Provide appropriate hygiene as needed including keeping skin clean and dry  - Evaluate need for skin moisturizer/barrier cream  - Collaborate with interdisciplinary team   - Patient/family teaching  - Consider wound care consult   Outcome: Progressing     Problem: PAIN - ADULT  Goal: Verbalizes/displays adequate comfort level or baseline comfort level  Description  Interventions:  - Encourage patient to monitor pain and request assistance  - Assess pain using appropriate pain scale  - Administer analgesics based on type and severity of pain and evaluate response  - Implement non-pharmacological measures as appropriate and evaluate response  - Consider cultural and social influences on pain and pain management  - Notify physician/advanced practitioner if interventions unsuccessful or patient reports new pain  Outcome: Progressing     Problem: INFECTION - ADULT  Goal: Absence or prevention of progression during hospitalization  Description  INTERVENTIONS:  - Assess and monitor for signs and symptoms of infection  - Monitor lab/diagnostic results  - Monitor all insertion sites, i e  indwelling lines, tubes, and drains  - Monitor endotracheal if appropriate and nasal secretions for changes in amount and color  - [place] appropriate cooling/warming therapies per order  - Administer medications as ordered  - Instruct and encourage patient and family to use good hand hygiene technique  - Identify and instruct in appropriate isolation precautions for identified infection/condition  Outcome: Progressing  Goal: Absence of fever/infection during neutropenic period  Description  INTERVENTIONS:  - Monitor WBC    Outcome: Progressing     Problem: SAFETY ADULT  Goal: Maintain or return to baseline ADL function  Description  INTERVENTIONS:  -  Assess patient's ability to carry out ADLs; assess patient's baseline for ADL function and identify physical deficits which impact ability to perform ADLs (bathing, care of mouth/teeth, toileting, grooming, dressing, etc )  - Assess/evaluate cause of self-care deficits   - Assess range of motion  - Assess patient's mobility; develop plan if impaired  - Assess patient's need for assistive devices and provide as appropriate  - Encourage maximum independence but intervene and supervise when necessary  - Involve family in performance of ADLs  - Assess for home care needs following discharge   - Consider OT consult to assist with ADL evaluation and planning for discharge  - Provide patient education as appropriate  Outcome: Progressing  Goal: Maintain or return mobility status to optimal level  Description  INTERVENTIONS:  - Assess patient's baseline mobility status (ambulation, transfers, stairs, etc )    - Identify cognitive and physical deficits and behaviors that affect mobility  - Identify mobility aids required to assist with transfers and/or ambulation (gait belt, sit-to-stand, lift, walker, cane, etc )  - [place] fall precautions as indicated by assessment  - Record patient progress and toleration of activity level on Mobility SBAR; progress patient to next Phase/Stage  - Instruct patient to call for assistance with activity based on assessment  - Consider rehabilitation consult to assist with strengthening/weightbearing, etc   Outcome: Progressing     Problem: DISCHARGE PLANNING  Goal: Discharge to home or other facility with appropriate resources  Description  INTERVENTIONS:  - Identify barriers to discharge w/patient and caregiver  - Arrange for needed discharge resources and transportation as appropriate  - Identify discharge learning needs (meds, wound care, etc )  - Arrange for interpretive services to assist at discharge as needed  - Refer to Case Management Department for coordinating discharge planning if the patient needs post-hospital services based on physician/advanced practitioner order or complex needs related to functional status, cognitive ability, or social support system  Outcome: Progressing     Problem: Knowledge Deficit  Goal: Patient/family/caregiver demonstrates understanding of disease process, treatment plan, medications, and discharge instructions  Description  Complete learning assessment and assess knowledge base    Interventions:  - Provide teaching at level of understanding  - Provide teaching via preferred learning methods  Outcome: Progressing

## 2020-07-16 NOTE — ASSESSMENT & PLAN NOTE
Awake and alert  Working with therapy  Alert to person and place  Incentive spirometer  Rib fracture protocol  Therapy  DVT prophylaxis  Pain management

## 2020-07-16 NOTE — PROGRESS NOTES
Progress Note - Pedro Ballard 1940, 79 y o  female MRN: [id number]    Unit/Bed#: [facility] 823-01 Encounter: [id number]    Primary Care Provider: GEORGIA Valdes   Date and time admitted to hospital: 7/15/2020 12:13 PM        Supranuclear palsy [facility])  Assessment & Plan  Cared for by family at home  Pleasant and interactive  Aware of where she is and why    * Closed fracture of multiple ribs of left side  Assessment & Plan  Awake and alert  Working with therapy  Alert to person and place  Incentive spirometer  Rib fracture protocol  Therapy  DVT prophylaxis  Pain management            Prophylaxis: Sequential compression device (Venodyne)     Disposition: home vs rehab    Code status:  Level 1 - Full Code    Consultants: therapy  Geriatrics    Is the patient 72 years or older?: YES:    1  Before the illness or injury that brought you to the Emergency, did you need someone to help you on a regular basis? 1=Yes   2  Since the illness or injury that brought you to the Emergency, have you needed more help than usual to take care of yourself? 1=Yes   3  Have you been hospitalized for one or more nights during the past 6 months (excluding a stay in the Emergency Department)? 0=No   4  In general, do you see well? 0=Yes   5  In general, do you have serious problems with your memory? 1=Yes   6  Do you take more than three different medications everyday? 1=Yes   TOTAL   4     Did you order a geriatric consult if the score was 2 or greater?: yes          SUBJECTIVE:     Transfer from: home  Outside Films Received: no  Tertiary Exam Due on: 7/16/20    Mechanism of Injury:Fall    Details related to Injury: +LOC:  unknown    Chief Complaint: rib pain    HPI/Last 24 hour events: admitted to trauma, rib fracture protocol, pain management  Work with therapy      Active medications:           Current Facility-Administered Medications:     acetaminophen (TYLENOL) tablet 975 mg, 975 mg, Oral, Q8H [facility]    aspirin chewable tablet 81 mg, 81 mg, Oral, Daily, 81 mg at 07/16/20 0824    enoxaparin (LOVENOX) subcutaneous injection 30 mg, 30 mg, Subcutaneous, BID, 30 mg at 07/16/20 0913    HYDROmorphone (DILAUDID) injection 0 2 mg, 0 2 mg, Intravenous, Q4H PRN    lidocaine (LIDODERM) 5 % patch 1 patch, 1 patch, Topical, Daily, 1 patch at 07/16/20 0824    methocarbamol (ROBAXIN) tablet 500 mg, 500 mg, Oral, Q8H SURI    oxybutynin (DITROPAN-XL) 24 hr tablet 10 mg, 10 mg, Oral, HS, 10 mg at 07/15/20 2319    oxyCODONE (ROXICODONE) IR tablet 2 5 mg, 2 5 mg, Oral, Q4H PRN, 2 5 mg at 07/16/20 0913    senna-docusate sodium (SENOKOT S) 8 6-50 mg per tablet 1 tablet, 1 tablet, Oral, Daily, 1 tablet at 07/16/20 0824      OBJECTIVE:     Vitals:   Vitals:    07/16/20 0649   BP: 134/76   Pulse: 66   Resp: 18   Temp: 98 °F (36 7 °C)   SpO2: 96%       Physical Exam:   GENERAL APPEARANCE: plesant  NEURO: awake and alert  HEENT: EOm's intact  CV: RRR< no complaint of chest pain  LUNGS: CTA bilaterally, no shortness of breath  GI: tolerating diet  : voiding  MSK: moves all four extremities, OT/PT working to get her out of the chair  SKIN: warm and dry    I/O:   I/O       07/14 0701 - 07/15 0700 07/15 0701 - 07/16 0700 07/16 0701 - 07/17 0700    P  O    300    Total Intake(mL/kg)   300 (4)    Net   +300           Unmeasured Urine Occurrence  3 x 1 x          Invasive Devices: Invasive Devices     Peripheral Intravenous Line            Peripheral IV 07/15/20 Right Antecubital less than 1 day                  Imaging:   Xr Ribs Left W Pa Chest Min 3 Views    Result Date: 7/15/2020  Impression: Acute fractures involving the left lateral 6th through 10th ribs, as described above  No definite pneumothorax identified on the current exam  Workstation performed: [id number]     Ct Head Without Contrast    Result Date: 7/15/2020  Impression: 1  No acute intracranial abnormality  Microangiopathic changes  2   Small right anterior scalp hematoma   Workstation performed: [id number]     Ct Chest Abdomen Pelvis W Contrast    Result Date: 7/15/2020  Impression: 1  Acute fractures of 6th through 10th left anterior lateral ribs with mild displacement of 7th and 8th fractures  No pneumothorax  2   No visceral injury within the chest, abdomen, pelvis  3   4 9 x 3 6 cm enhancing nodule within the right aspect of the lower uterine segment  Further gynecologic workup is recommended  4   Bladder wall submucosal hyperenhancement with mild perivesicular inflammatory change, which may indicate cystitis  Correlation with urinalysis recommended  The study was marked in [facility] for immediate notification  Workstation performed: [id number]       Labs: Results for Estrella Lynn (MRN [id number]) as of 7/16/2020 11:54   Ref   Range 7/16/2020 04:51   Sodium Latest Ref Range: 136 - 145 mmol/L 144   Potassium Latest Ref Range: 3 5 - 5 3 mmol/L 3 6   Chloride Latest Ref Range: 100 - 108 mmol/L 113 (H)   CO2 Latest Ref Range: 21 - 32 mmol/L 26   Anion Gap Latest Ref Range: 4 - 13 mmol/L 5   BUN Latest Ref Range: 5 - 25 mg/dL 14   Creatinine Latest Ref Range: 0 60 - 1 30 mg/dL 0 69   Glucose, Random Latest Ref Range: 65 - 140 mg/dL 74   Calcium Latest Ref Range: 8 3 - 10 1 mg/dL 9 2   eGFR Latest Units: ml/min/1 73sq m 83   WBC Latest Ref Range: 4 31 - 10 16 Thousand/uL 7 54   Red Blood Cell Count Latest Ref Range: 3 81 - 5 12 Million/uL 4 77   Hemoglobin Latest Ref Range: 11 5 - 15 4 g/dL 14 4   HCT Latest Ref Range: 34 8 - 46 1 % 44 3   MCV Latest Ref Range: 82 - 98 fL 93   MCH Latest Ref Range: 26 8 - 34 3 pg 30 2   MCHC Latest Ref Range: 31 4 - 37 4 g/dL 32 5   RDW Latest Ref Range: 11 6 - 15 1 % 13 7   Platelet Count Latest Ref Range: 149 - 390 Thousands/uL 211   MPV Latest Ref Range: 8 9 - 12 7 fL 10 5

## 2020-07-16 NOTE — CONSULTS
Consultation - Geriatrics   Pedro Ballard 79 y o  female MRN: [id number]  Unit/Bed#: [facility] 823-01 Encounter: [id number]      Assessment/Plan  1  Ambulatory dysfunction  Related to PSP  Fall precautions  PT/OT    2  Overactive bladder  Urinary incontinence  Pt uses oxybutynin  Recommend scheduled toileting  Monitor for constipation    3  Acute pain due to rib fracture  APS on consult  Monitor for constipation  Geriatric pain protocol  Scheduled acetaminophen    4  Dementia  Related to PSP  Continue supportive care  Need assistance with ADLs    5  Insomnia  Related to psp, hospitalization contributing  Avoid sedative hypnotics such as benzodiazepines and benadryl  Encourage staying awake during the day  Encourage daytime activity, morning exercise  Decrease or eliminate day time naps  Establish a night time routine  Recommend melatonin 3mg po QHS    6  Delirium precautions  Alert and oriented x3  Provide frequent redirection, reorientation, distraction techniques  Avoid deliriogenic medications such as tramadol, benzodiazepines, anticholinergics,  Benadryl  Treat pain, See geriatric pain protocol  Monitor for constipation and urinary retention  Encourage early and frequent moblization, OOB  Encourage Hydration/ Nutrition  Implement sleep hygiene, limit night time interuptions, group activities  Avoid physical restraints    7  Closed fracture of left side multiple ribs  Rib fracture protocol  IS  APS  PT/OT    8  Progressive Supranuclear palsy  Seen by neurology 2017  Previously trialled sinemet without success  Continue supportive care  PT/OT    9  Frailty  Severe  Risk factors: ambulatory dysfunction, incontinence, dementia, limited caregiver support  Albumin 3 7  PT/OT  Caregiver support     10   Home medications:  Per Brian MCKEON: [phone]  And review of PCP notes    Asa 81 mg po daily  Oxybutynin ER 10 mg  1 po daily  Senna 1 po daily      History of Present Illness   Physician Requesting Consult: Ag Byrne MD  Reason for Consult / Principal Problem:   Hx and PE limited by:   HPI: Celio Cunha is a 79y o  year old female who presents with left sided rib pain  Daughter thinks pt fell on Sunday night  She does not believe she fell to the ground because she did not hear her and did not need assistance to get up  She has supranuclear palsy, dementia, HTN, urinary incontinence hx of DVT/ PE     Prior to arrival pt lives with daughter  She had previous admission at [facility] 3/4/2020-3/11/2020  She was discharged to [address] [place] [address]  Presently she lives with her daughters  She rotates who she stays with  Her daughters assist with IADLs and ADLs  She ambulates with a roller walker  She has a wheelchair  She wears glasses, dentures  She needs assistance to feed eat  She has chronic insomnia, incontinence, constipation  Upon exam pt is oob in recliner chair  She is alert and oriented x3  Her voice is soft, speech delayed  She is able to answer questions appropriately  HPI obtained by patient and review of epic documentation    Attempted to call Daughter Lashonda Musa , unable to contact    Inpatient consult to Gerontology  Consult performed by: GEORGIA Mcintosh  Consult ordered by: GEORGIA Quinones          Review of Systems   Constitutional: Negative for unexpected weight change  HENT: Negative for hearing loss  Eyes: Negative for visual disturbance  Respiratory: Negative for shortness of breath  Cardiovascular: Negative for chest pain  Gastrointestinal: Negative for constipation  Genitourinary:        Incontinence   Musculoskeletal: Positive for gait problem  Neurological: Positive for weakness  Negative for dizziness  Psychiatric/Behavioral: Positive for decreased concentration and sleep disturbance         Historical Information   Past Medical History:   Diagnosis Date    Supranuclear palsy [facility])      Past Surgical History:   Procedure Laterality Date    BACK SURGERY      HIP FRACTURE SURGERY       Social History   Social History     Substance and Sexual Activity   Alcohol Use Not Currently    Frequency: Never     Social History     Substance and Sexual Activity   Drug Use Never     Social History     Tobacco Use   Smoking Status Never Smoker   Smokeless Tobacco Never Used         Family History:   Family History   Family history unknown: Yes       Meds/Allergies   Current meds:   Current Facility-Administered Medications   Medication Dose Route Frequency    acetaminophen (TYLENOL) tablet 975 mg  975 mg Oral Q8H [address]    aspirin chewable tablet 81 mg  81 mg Oral Daily    enoxaparin (LOVENOX) subcutaneous injection 30 mg  30 mg Subcutaneous BID    HYDROmorphone (DILAUDID) injection 0 2 mg  0 2 mg Intravenous Q4H PRN    lidocaine (LIDODERM) 5 % patch 1 patch  1 patch Topical Daily    methocarbamol (ROBAXIN) tablet 500 mg  500 mg Oral Q8H [address]    oxybutynin (DITROPAN-XL) 24 hr tablet 10 mg  10 mg Oral HS    oxyCODONE (ROXICODONE) IR tablet 2 5 mg  2 5 mg Oral Q4H PRN    senna-docusate sodium (SENOKOT S) 8 6-50 mg per tablet 1 tablet  1 tablet Oral Daily        Allergies   Allergen Reactions    Procaine Anaphylaxis and Other (See Comments)     Got a really sore mouth, long ago  Got a really sore mouth, long ago      Penicillins Other (See Comments)    Sulfa Antibiotics     Sulfacetamide        Objective   Vitals: Blood pressure 134/76, pulse 66, temperature 98 °F (36 7 °C), temperature source Oral, resp  rate 18, weight 75 8 kg (167 lb), SpO2 96 %  ,Body mass index is 27 37 kg/m²  Physical Exam   Constitutional: She is oriented to person, place, and time  No distress  HENT:   Head: Normocephalic  Eyes: Right eye exhibits no discharge  Left eye exhibits no discharge  Neck:   torticollis   Cardiovascular: Normal rate and regular rhythm  Exam reveals no friction rub  No murmur heard    Pulmonary/Chest: Effort normal and breath sounds normal  No stridor  No respiratory distress  She has no wheezes  Abdominal: Soft  Bowel sounds are normal  She exhibits no distension  There is no tenderness  Musculoskeletal: She exhibits no edema or deformity  Neurological: She is alert and oriented to person, place, and time  Skin: Skin is warm and dry  She is not diaphoretic  Psychiatric:   Flat affect   Nursing note and vitals reviewed  Lab Results:   Results from last 7 days   Lab Units 07/16/20  0451   WBC Thousand/uL 7 54   HEMOGLOBIN g/dL 14 4   HEMATOCRIT % 44 3   PLATELETS Thousands/uL 211        Results from last 7 days   Lab Units 07/16/20  0451   POTASSIUM mmol/L 3 6   CHLORIDE mmol/L 113*   CO2 mmol/L 26   BUN mg/dL 14   CREATININE mg/dL 0 69   CALCIUM mg/dL 9 2       Imaging Studies: I have personally reviewed pertinent reports  EKG, Pathology, and Other Studies: I have personally reviewed pertinent reports      VTE Prophylaxis: Sequential compression device (Venodyne)     Code Status: Level 1 - Full Code

## 2020-07-16 NOTE — SOCIAL WORK
CM met with pt and her dtr Madhu Lopez to discuss the role of CM  Pt resides with her  in a 2 story home which has 0STE and pt remains on the first floor  Pt requires assistance for ADLs  Pt owns a walker, cane, wheelchair, shower chair, and BSC  Pt has a living will  Pt's pharmacy is [facility]  Pt has no hx of mental health or substance abuse  Pt's been to [facility]  Pt is active with [facility] for home care  Pt is cleared for a home d/c with resumption of VNA  CM placed referral with [facility]  Plan to d/c home tomorrow  Pt's dtr Shilpi Bhagat will transport  CM reviewed d/c planning process including the following: identifying help at home, patient preference for d/c planning needs, Discharge Lounge, [facility] Meds to Bed program, availability of treatment team to discuss questions or concerns patient and/or family may have regarding understanding medications and recognizing signs and symptoms once discharged  CM also encouraged patient to follow up with all recommended appointments after discharge  Patient advised of importance for patient and family to participate in managing patients medical well being

## 2020-07-17 VITALS
HEART RATE: 70 BPM | DIASTOLIC BLOOD PRESSURE: 79 MMHG | BODY MASS INDEX: 27.84 KG/M2 | TEMPERATURE: 97.7 F | OXYGEN SATURATION: 95 % | WEIGHT: 167.11 LBS | RESPIRATION RATE: 16 BRPM | HEIGHT: 65 IN | SYSTOLIC BLOOD PRESSURE: 138 MMHG

## 2020-07-17 PROBLEM — N39.0 UTI (URINARY TRACT INFECTION): Status: ACTIVE | Noted: 2020-07-17

## 2020-07-17 PROBLEM — N85.8 UTERINE MASS: Status: ACTIVE | Noted: 2020-07-17

## 2020-07-17 PROCEDURE — 99232 SBSQ HOSP IP/OBS MODERATE 35: CPT | Performed by: NURSE PRACTITIONER

## 2020-07-17 PROCEDURE — 99238 HOSP IP/OBS DSCHRG MGMT 30/<: CPT | Performed by: NURSE PRACTITIONER

## 2020-07-17 PROCEDURE — NC001 PR NO CHARGE: Performed by: NURSE PRACTITIONER

## 2020-07-17 PROCEDURE — 97530 THERAPEUTIC ACTIVITIES: CPT

## 2020-07-17 PROCEDURE — 99233 SBSQ HOSP IP/OBS HIGH 50: CPT | Performed by: ANESTHESIOLOGY

## 2020-07-17 PROCEDURE — 97116 GAIT TRAINING THERAPY: CPT

## 2020-07-17 PROCEDURE — 97110 THERAPEUTIC EXERCISES: CPT

## 2020-07-17 PROCEDURE — 94760 N-INVAS EAR/PLS OXIMETRY 1: CPT

## 2020-07-17 RX ORDER — CEPHALEXIN 500 MG/1
500 CAPSULE ORAL EVERY 6 HOURS
Qty: 24 CAPSULE | Refills: 0 | Status: SHIPPED | OUTPATIENT
Start: 2020-07-18 | End: 2020-07-24

## 2020-07-17 RX ORDER — CEPHALEXIN 500 MG/1
500 CAPSULE ORAL EVERY 12 HOURS SCHEDULED
Status: DISCONTINUED | OUTPATIENT
Start: 2020-07-18 | End: 2020-07-17

## 2020-07-17 RX ORDER — ACETAMINOPHEN 325 MG/1
650 TABLET ORAL EVERY 6 HOURS PRN
Qty: 30 TABLET | Refills: 0
Start: 2020-07-17

## 2020-07-17 RX ORDER — CEPHALEXIN 500 MG/1
500 CAPSULE ORAL EVERY 6 HOURS
Status: DISCONTINUED | OUTPATIENT
Start: 2020-07-18 | End: 2020-07-17 | Stop reason: HOSPADM

## 2020-07-17 RX ORDER — CEPHALEXIN 500 MG/1
500 CAPSULE ORAL EVERY 6 HOURS
Qty: 24 CAPSULE | Refills: 0 | Status: SHIPPED | OUTPATIENT
Start: 2020-07-18 | End: 2020-07-17

## 2020-07-17 RX ORDER — OXYCODONE HYDROCHLORIDE 5 MG/1
5 TABLET ORAL EVERY 4 HOURS PRN
Status: DISCONTINUED | OUTPATIENT
Start: 2020-07-17 | End: 2020-07-17 | Stop reason: HOSPADM

## 2020-07-17 RX ORDER — OXYCODONE HYDROCHLORIDE 5 MG/1
5 TABLET ORAL EVERY 4 HOURS PRN
Qty: 20 TABLET | Refills: 0 | Status: SHIPPED | OUTPATIENT
Start: 2020-07-17

## 2020-07-17 RX ADMIN — ASPIRIN 81 MG 81 MG: 81 TABLET ORAL at 09:21

## 2020-07-17 RX ADMIN — CEFTRIAXONE SODIUM 1000 MG: 10 INJECTION, POWDER, FOR SOLUTION INTRAVENOUS at 10:24

## 2020-07-17 RX ADMIN — ACETAMINOPHEN 975 MG: 325 TABLET, FILM COATED ORAL at 05:27

## 2020-07-17 RX ADMIN — METHOCARBAMOL TABLETS 500 MG: 500 TABLET, COATED ORAL at 05:27

## 2020-07-17 RX ADMIN — DOCUSATE SODIUM AND SENNOSIDES 1 TABLET: 8.6; 5 TABLET ORAL at 09:21

## 2020-07-17 RX ADMIN — LIDOCAINE 1 PATCH: 50 PATCH TOPICAL at 09:20

## 2020-07-17 NOTE — PLAN OF CARE
Problem: PHYSICAL THERAPY ADULT  Goal: Performs mobility at highest level of function for planned discharge setting  See evaluation for individualized goals  Description  Treatment/Interventions: Functional transfer training, LE strengthening/ROM, Therapeutic exercise, Endurance training, Cognitive reorientation, Patient/family training, Equipment eval/education, Bed mobility, Gait training          See flowsheet documentation for full assessment, interventions and recommendations  Outcome: Progressing  Note:   Prognosis: Good  Problem List: Decreased strength, Decreased endurance, Impaired balance, Decreased mobility, Decreased coordination, Decreased cognition, Decreased safety awareness, Pain  Assessment: Patient lying supine in bed, agreeable to participate in therapy  She requires mod A for bed mobility due to increase in pain symptoms with movement  She was able to sit EOB with dynamic movements, requiring redirection to task frequently, but able to perform TE  She was able to ambulate increase distances, with cues for proper use of RW and rehan LE correction due to ataxia  She requires increase time for mobility at this time  She would continue to  benefit from skilled PT to maximzie functional independence  Patient requires 24 hour care if she returns home for safety at this time with HHPT  PT Discharge Recommendation: Home with skilled therapy(family assist at all times)     PT - OK to Discharge: Yes(pending confirmation w family for assistance)    See flowsheet documentation for full assessment

## 2020-07-17 NOTE — PROGRESS NOTES
Progress Note - Sandra Watts 1940, 79 y o  female MRN: [id number]    Unit/Bed#: [facility] 823-01 Encounter: [id number]    Primary Care Provider: GEORGIA Mas   Date and time admitted to hospital: 7/15/2020 12:13 PM        Uterine mass  Assessment & Plan  - incidental uterine nodule noted on CT imaging  - recommend outpatient follow-up with gynecology     UTI (urinary tract infection)  Assessment & Plan  UTI present on admission  Rocephin, PO keflex for DC     Supranuclear palsy (HCC)  Assessment & Plan  Cont supportive care     * Closed fracture of multiple ribs of left side  Assessment & Plan  Remains on room air without hypoxia  IS  PO analgesia   PT/OT recommending ok for return to home environment with support  Stable for DC today  outpatient follow-up with trauma           Disposition: medically stable for DC, CM discussion with daughter yesterday with states preference for patient to return home with her current support/care  Attempt to call daughter to determine DC plan but no answer  Will try again this afternoon  SUBJECTIVE:  Chief Complaint: "Sometimes a little pain in my chest"    Subjective: reports rib cage pain in her chest resolves with pain medications  Describes as mild to moderate  Denies any SOB or ERNST  Reports new foul smelling urine with minor dysuria - she was made aware of UTI noted on UA  Denies any other complaints, no nausea, vomiting, headache or any other pain/joint injury         OBJECTIVE:     Meds/Allergies     Current Facility-Administered Medications:     acetaminophen (TYLENOL) tablet 975 mg, 975 mg, Oral, Q8H [address], Noris GEORGIA Streeter, 975 mg at 07/17/20 7353    aspirin chewable tablet 81 mg, 81 mg, Oral, Daily, Samuel He MD, 81 mg at 07/17/20 4340    cefTRIAXone (ROCEPHIN) 1,000 mg in dextrose 5 % 50 mL IVPB, 1,000 mg, Intravenous, Q24H, GEORGIA Silva, Last Rate: 100 mL/hr at 07/17/20 1024, 1,000 mg at 07/17/20 1024    enoxaparin (LOVENOX) subcutaneous injection 30 mg, 30 mg, Subcutaneous, BID, Seymour Hernandez MD, 30 mg at 07/16/20 1703    lidocaine (LIDODERM) 5 % patch 1 patch, 1 patch, Topical, Daily, Seymour Hernandez MD, 1 patch at 07/17/20 0920    oxybutynin (DITROPAN-XL) 24 hr tablet 10 mg, 10 mg, Oral, HS, Seymour Hernandez MD, 10 mg at 07/16/20 2125    oxyCODONE (ROXICODONE) IR tablet 2 5 mg, 2 5 mg, Oral, Q4H PRN, Seymour Hernandez MD, 2 5 mg at 07/16/20 0913    oxyCODONE (ROXICODONE) IR tablet 5 mg, 5 mg, Oral, Q4H PRN, GEORGIA Ferrell    senna-docusate sodium (SENOKOT S) 8 6-50 mg per tablet 1 tablet, 1 tablet, Oral, Daily, Seymour Hernandez MD, 1 tablet at 07/17/20 0921     Vitals:   Vitals:    07/17/20 0743   BP: 138/79   Pulse: 70   Resp: 16   Temp: 97 7 °F (36 5 °C)   SpO2: 95%       Intake/Output:  I/O       07/15 0701 - 07/16 0700 07/16 0701 - 07/17 0700 07/17 0701 - 07/18 0700    P  O   540 60    Total Intake(mL/kg)  540 (7 1) 60 (0 8)    Urine (mL/kg/hr)  100 (0 1)     Total Output  100     Net  +440 +60           Unmeasured Urine Occurrence 3 x 3 x 1 x           Nutrition/GI Proph/Bowel Reg: regular     Physical Exam:   Physical Exam   Constitutional: She is oriented to person, place, and time  No distress  HENT:   Head: Normocephalic and atraumatic  Eyes: Pupils are equal, round, and reactive to light  Right eye exhibits no discharge  Left eye exhibits no discharge  Neck: Normal range of motion  No tracheal deviation present  Cardiovascular: Normal rate, regular rhythm, normal heart sounds and intact distal pulses  Pulmonary/Chest: Effort normal and breath sounds normal  No stridor  No respiratory distress  She has no wheezes  She has no rales  She exhibits tenderness (reprodicible rib cage pain)  Abdominal: Soft  Bowel sounds are normal  She exhibits no distension  There is no tenderness  There is no guarding  Musculoskeletal: She exhibits no edema, tenderness or deformity     Neurological: She is alert and oriented to person, place, and time  Slow movements and speech but equal bilaterally    Skin: Skin is warm and dry  She is not diaphoretic  Invasive Devices     Peripheral Intravenous Line            Peripheral IV 07/15/20 Right Antecubital 1 day          Drain            External Urinary Catheter less than 1 day                 Lab Results: Results: I have personally reviewed pertinent reports   , BMP/CMP: No results found for: SODIUM, K, CL, CO2, ANIONGAP, BUN, CREATININE, GLUCOSE, CALCIUM, AST, ALT, ALKPHOS, PROT, BILITOT, EGFR and CBC: No results found for: WBC, HGB, HCT, MCV, PLT, ADJUSTEDWBC, MCH, MCHC, RDW, MPV, NRBC  Imaging/EKG Studies: Results: I have personally reviewed pertinent reports      Other Studies:   VTE Prophylaxis: Sequential compression device (Venodyne)  and Enoxaparin (Lovenox)

## 2020-07-17 NOTE — DISCHARGE INSTRUCTIONS
Rib Fracture Discharge Instructions: Your rib fractures will take time to heal  Do not do any strenuous activity or lift any thing more than 10 pounds until you are cleared to do so by the Trauma clinic  Take your pain medication, if needed, as prescribed and make sure you continue to perform cough and deep breathing exercises and use your incentive spirometer every two hours while awake to maintain healthy lungs post injury  You should be seen in the Trauma Clinic 2-3 weeks after being discharged  Please call the Trauma Clinic sooner if you have any questions or concerns or if you experience increased work of breathing, shortness of breath, difficulty breathing, activity intolerance or chest pain  Urinary Traction Infection in Older Adults   WHAT YOU NEED TO KNOW:   A urinary tract infection (UTI) is caused by bacteria that get inside your urinary tract  Your urinary tract includes your kidneys, ureters, bladder, and urethra  Urine is made in your kidneys, and it flows from the ureters to the bladder  Urine leaves the bladder through the urethra  A UTI is more common in your lower urinary tract, which includes your bladder and urethra  DISCHARGE INSTRUCTIONS:   Return to the emergency department if:   · You are urinating very little or not at all  · You are vomiting  · You have a high fever with shaking chills  · You have side or back pain that gets worse  Contact your healthcare provider if:   · You have a fever  · You are a woman and you have increased white or yellow discharge from your vagina  · You do not feel better after 2 days of taking antibiotics  · You have questions or concerns about your condition or care  Medicines:   · Medicines  help treat the bacterial infection or decrease pain and burning when you urinate  You may also need medicines to decrease the urge to urinate often   Your healthcare provider may recommend cranberry juice or cranberry supplements to help decrease your symptoms  · Take your medicine as directed  Contact your healthcare provider if you think your medicine is not helping or if you have side effects  Tell him or her if you are allergic to any medicine  Keep a list of the medicines, vitamins, and herbs you take  Include the amounts, and when and why you take them  Bring the list or the pill bottles to follow-up visits  Carry your medicine list with you in case of an emergency  Self-care:   · Urinate when you feel the urge  Do not hold your urine because bacteria can grow in the bladder if urine stays in the bladder too long  It may be helpful to urinate at least every 3 to 4 hours  · Drink liquids as directed  Liquids can help flush bacteria from your urinary tract  Ask how much liquid to drink each day and which liquids are best for you  You may need to drink more liquids than usual to help flush out the bacteria  Do not drink alcohol, caffeine, and citrus juices  These can irritate your bladder and increase your symptoms  · Apply heat  on your abdomen for 20 to 30 minutes every 2 hours for as many days as directed  Heat helps decrease discomfort and pressure in your bladder  Prevent a UTI:   · Women should wipe front to back  after urinating or having a bowel movement  This may prevent germs from getting into the urinary tract  · Urinate after you have sex  to flush away bacteria that can enter your urinary tract during sex  · Wear cotton underwear and clothes that fit loose  Tight pants and nylon underwear can trap moisture and cause bacteria to grow  Follow up with your healthcare provider as directed:  Write down your questions so you remember to ask them during your visits  © 2017 [employer]  Information is for End User's use only and may not be sold, redistributed or otherwise used for commercial purposes   All illustrations and images included in CareNotes® are the copyrighted property of [employer]  or [facility] [employer]  The above information is an  only  It is not intended as medical advice for individual conditions or treatments  Talk to your doctor, nurse or pharmacist before following any medical regimen to see if it is safe and effective for you

## 2020-07-17 NOTE — PLAN OF CARE
Problem: Potential for Falls  Goal: Patient will remain free of falls  Description  INTERVENTIONS:  - Assess patient frequently for physical needs  -  Identify cognitive and physical deficits and behaviors that affect risk of falls    -  [place] fall precautions as indicated by assessment   - Educate patient/family on patient safety including physical limitations  - Instruct patient to call for assistance with activity based on assessment  - Modify environment to reduce risk of injury  - Consider OT/PT consult to assist with strengthening/mobility  Outcome: Progressing     Problem: Prexisting or High Potential for Compromised Skin Integrity  Goal: Skin integrity is maintained or improved  Description  INTERVENTIONS:  - Identify patients at risk for skin breakdown  - Assess and monitor skin integrity  - Assess and monitor nutrition and hydration status  - Monitor labs   - Assess for incontinence   - Turn and reposition patient  - Assist with mobility/ambulation  - Relieve pressure over bony prominences  - Avoid friction and shearing  - Provide appropriate hygiene as needed including keeping skin clean and dry  - Evaluate need for skin moisturizer/barrier cream  - Collaborate with interdisciplinary team   - Patient/family teaching  - Consider wound care consult   Outcome: Progressing     Problem: PAIN - ADULT  Goal: Verbalizes/displays adequate comfort level or baseline comfort level  Description  Interventions:  - Encourage patient to monitor pain and request assistance  - Assess pain using appropriate pain scale  - Administer analgesics based on type and severity of pain and evaluate response  - Implement non-pharmacological measures as appropriate and evaluate response  - Consider cultural and social influences on pain and pain management  - Notify physician/advanced practitioner if interventions unsuccessful or patient reports new pain  Outcome: Progressing     Problem: INFECTION - ADULT  Goal: Absence or prevention of progression during hospitalization  Description  INTERVENTIONS:  - Assess and monitor for signs and symptoms of infection  - Monitor lab/diagnostic results  - Monitor all insertion sites, i e  indwelling lines, tubes, and drains  - Monitor endotracheal if appropriate and nasal secretions for changes in amount and color  - [place] appropriate cooling/warming therapies per order  - Administer medications as ordered  - Instruct and encourage patient and family to use good hand hygiene technique  - Identify and instruct in appropriate isolation precautions for identified infection/condition  Outcome: Progressing  Goal: Absence of fever/infection during neutropenic period  Description  INTERVENTIONS:  - Monitor WBC    Outcome: Progressing     Problem: SAFETY ADULT  Goal: Maintain or return to baseline ADL function  Description  INTERVENTIONS:  -  Assess patient's ability to carry out ADLs; assess patient's baseline for ADL function and identify physical deficits which impact ability to perform ADLs (bathing, care of mouth/teeth, toileting, grooming, dressing, etc )  - Assess/evaluate cause of self-care deficits   - Assess range of motion  - Assess patient's mobility; develop plan if impaired  - Assess patient's need for assistive devices and provide as appropriate  - Encourage maximum independence but intervene and supervise when necessary  - Involve family in performance of ADLs  - Assess for home care needs following discharge   - Consider OT consult to assist with ADL evaluation and planning for discharge  - Provide patient education as appropriate  Outcome: Progressing  Goal: Maintain or return mobility status to optimal level  Description  INTERVENTIONS:  - Assess patient's baseline mobility status (ambulation, transfers, stairs, etc )    - Identify cognitive and physical deficits and behaviors that affect mobility  - Identify mobility aids required to assist with transfers and/or ambulation (gait belt, sit-to-stand, lift, walker, cane, etc )  - [place] fall precautions as indicated by assessment  - Record patient progress and toleration of activity level on Mobility SBAR; progress patient to next Phase/Stage  - Instruct patient to call for assistance with activity based on assessment  - Consider rehabilitation consult to assist with strengthening/weightbearing, etc   Outcome: Progressing     Problem: DISCHARGE PLANNING  Goal: Discharge to home or other facility with appropriate resources  Description  INTERVENTIONS:  - Identify barriers to discharge w/patient and caregiver  - Arrange for needed discharge resources and transportation as appropriate  - Identify discharge learning needs (meds, wound care, etc )  - Arrange for interpretive services to assist at discharge as needed  - Refer to Case Management Department for coordinating discharge planning if the patient needs post-hospital services based on physician/advanced practitioner order or complex needs related to functional status, cognitive ability, or social support system  Outcome: Progressing     Problem: Knowledge Deficit  Goal: Patient/family/caregiver demonstrates understanding of disease process, treatment plan, medications, and discharge instructions  Description  Complete learning assessment and assess knowledge base    Interventions:  - Provide teaching at level of understanding  - Provide teaching via preferred learning methods  Outcome: Progressing

## 2020-07-17 NOTE — ASSESSMENT & PLAN NOTE
Remains on room air without hypoxia  IS  PO analgesia   PT/OT recommending ok for return to home environment with support  Stable for DC today  outpatient follow-up with trauma

## 2020-07-17 NOTE — INCIDENTAL FINDINGS
The following findings require follow up:  Radiographic finding   Findin 2 x 1 1 cm right thyroid nodules present   Incidental discovery of one or more thyroid nodule(s) measuring less than 1 5 cm and without suspicious features is noted in this patient who is above 28years old; according to guidelines published in the 2015 white paper on incidental thyroid nodules in the Journal of the [employer] of [facility]), no further evaluation is recommended  There are one or more hepatic simple cyst(s) present  Punctate nonobstructing calculi are present  4 9 x 3 6 cm enhancing nodule is present within the right aspect of the lower uterine segment - Further gynecologic workup is recommended  There is bladder wall submucosal hyperenhancement with some mild perivesicular inflammatory changes, which may indicate cystitis  Follow up should be done within 2-4 week(s)    Please notify the following clinician to assist with the follow up:   [address] (PCP)   And with Gynecology, if you do not have a gynecologist please call [phone] ([employer])  to help establish a provider within your area and that accepts your insurance

## 2020-07-17 NOTE — PHYSICAL THERAPY NOTE
Physical Therapy Progress Note        07/17/20 1227   Pain Assessment   Pain Assessment Tool 0-10   Pain Score No Pain   Pain Location/Orientation Orientation: Left; Location: Rib Cage   Hospital Pain Intervention(s) Repositioned; Ambulation/increased activity; Emotional support   Restrictions/Precautions   Weight Bearing Precautions Per Order No   Other Precautions Cognitive; Chair Alarm; Bed Alarm; Fall Risk;Pain   General   Chart Reviewed Yes   Response to Previous Treatment Patient with no complaints from previous session  Family/Caregiver Present No   Cognition   Overall Cognitive Status Impaired   Arousal/Participation Alert; Cooperative   Comments Requires cueing frequently and redirection to task, very pleasant    Bed Mobility   Supine to Sit 3  Moderate assistance   Additional items Assist x 1;HOB elevated; Bedrails; Increased time required;LE management;Verbal cues   Transfers   Sit to Stand 4  Minimal assistance   Additional items Assist x 1; Increased time required;Verbal cues   Stand to Sit 4  Minimal assistance   Additional items Assist x 1; Increased time required;Verbal cues   Ambulation/Elevation   Gait pattern Ataxia; Excessively slow; Shuffling; Short stride   Gait Assistance 4  Minimal assist   Additional items Assist x 1;Verbal cues; Tactile cues   Assistive Device Rolling walker   Distance 15 feet x 2   Balance   Static Sitting Fair -   Dynamic Sitting Poor   Static Standing Poor +   Dynamic Standing Poor +   Ambulatory Poor +   Activity Tolerance   Activity Tolerance Patient limited by fatigue;Patient limited by pain   Nurse Made Aware Appropriate to see per RN   Exercises   Heelslides Supine;10 reps;AROM; Bilateral   Hip Flexion Sitting;10 reps;AROM; Bilateral   Hip Abduction Sitting;10 reps;AROM; Bilateral   Knee AROM Long Arc Quad Sitting;10 reps;AROM; Bilateral   Assessment   Prognosis Good   Problem List Decreased strength;Decreased endurance; Impaired balance;Decreased mobility; Decreased coordination;Decreased cognition;Decreased safety awareness;Pain   Assessment Patient lying supine in bed, agreeable to participate in therapy  She requires mod A for bed mobility due to increase in pain symptoms with movement  She was able to sit EOB with dynamic movements, requiring redirection to task frequently, but able to perform TE  She was able to ambulate increase distances, with cues for proper use of RW and rehan LE correction due to ataxia  She requires increase time for mobility at this time  She would continue to  benefit from skilled PT to [facility] functional independence  Patient requires 24 hour care if she returns home for safety at this time with HHPT  Goals   Patient Goals To go home   STG Expiration Date 07/26/20   PT Treatment Day 1   Plan   Treatment/Interventions Functional transfer training;LE strengthening/ROM; Therapeutic exercise; Endurance training;Bed mobility;Gait training;Spoke to nursing   PT Frequency   (3-6x a week)   Recommendation   PT Discharge Recommendation Home with skilled therapy  (family assist at all times)   Equipment Recommended Walker  (RW)   PT - OK to Discharge Yes  (pending confirmation w family for assistance)     Shalom Morrison, PTA

## 2020-07-17 NOTE — SOCIAL WORK
Pt is medically stable for d/c  I met patient at bedside to discuss same but this CM is unable to understand her  Left message for daughter, Dayne Mooney regarding same and to discuss transportation  MSW CM who opened case, Cyndee Rashid's note states that daughter, Aris Saeed to transport but there is no contact number for her  Dakotah Viveros has been notified of discharge

## 2020-07-17 NOTE — QUICK NOTE
Attempted to call daughter Raphael Farrar with update today, review incidentals and determine DC planning  Patient is medically stable for DC at this time  No answer on phone, will call back this afternoon

## 2020-07-17 NOTE — PROGRESS NOTES
Progress Note - Santos Mccrary 79 y o  female MRN: [id number]    Unit/Bed#: [facility] 823-01 Encounter: [id number]      Assessment/Plan:  1  Ambulatory dysfunction  Related to PSP  spastic extremities  PT/OT  Fall precautions  Recommend bed/ chair alarm    2  Overactive bladder  Urinary incontinence at baseline  Pt uses oxybutynin  Recommend scheduled toileting  Monitor for constipation    3  Acute pain due to rib fracture  APS on consult  Monitor for constipation  Geriatric pain protocol  scheduled acetaminophen    4  Dementia  Related to PSP, period of confusion last evening, sundowning verus change in environment? Continue supportive care  Need assistance with ADLs    5  Insomnia  Related to PSP, hospitalization contributing  Maintain circadian rhythm  Melatonin 3 mg po QHS    6  Delirium precautions  Alert and oriented x3  Provide frequent redirection, reorientation, distraction techniques  Avoid deliriogenic medications such as tramadol, benzodiazepines, anticholinergics,  Benadryl  Treat pain, See geriatric pain protocol  Monitor for constipation and urinary retention  Encourage early and frequent moblization, OOB  Encourage Hydration/ Nutrition  Implement sleep hygiene, limit night time interuptions, group activities    7  Frailty  Severe  Risk factors: ambulatory dysfunction, incontinence, dementia, limited caregiver support  Albumin 3 7  PT/OT  Caregiver support    8  Closed fracture of left side multiple ribs  Rib fracture protocol  IS  APS  PT/OT     9   Progressive Supranuclear palsy  Seen by neurology 2017  Previously trialled sinemet without success  Continue supportive care  PT/OT            Subjective:   Upon exam pt is in bed  She is alert and oriented x4  She states she slept well last night  Nurse extern at bedside who states she was oob this am and ate breakfast      Objective:     Vitals: Blood pressure 138/79, pulse 70, temperature 97 7 °F (36 5 °C), resp   rate 16, height 5' 5" (1 651 m), weight 75 8 kg (167 lb 1 7 oz), SpO2 95 %  ,Body mass index is 27 81 kg/m²  Intake/Output Summary (Last 24 hours) at 7/17/2020 1013  Last data filed at 7/17/2020 0900  Gross per 24 hour   Intake 300 ml   Output 100 ml   Net 200 ml       Physical Exam:   General : NAD, flat affect  HEENT : MMM, bilateral pinpoint pupils  Heart : Normal rate, no murmur rub or gallop  Lungs : CTA no wheezes, rales or rhonchi  Abdomen : Soft, NT/ND, BS auscultated in all 4 quads  Ext :  no edema, torticollis, spacity to all extremities  Skin : Pink, warm, dry, age appropriate turgor and mobility  Neuro : Nonfocal  Psych : Alert and O x 3      Invasive Devices     Peripheral Intravenous Line            Peripheral IV 07/15/20 Right Antecubital 1 day          Drain            External Urinary Catheter less than 1 day                Lab, Imaging and other studies: I have personally reviewed pertinent reports      VTE Pharmacologic Prophylaxis: Sequential compression device (Venodyne)   VTE Mechanical Prophylaxis: sequential compression device

## 2020-07-17 NOTE — DISCHARGE SUMMARY
Discharge Summary - Shubham Palumbo 79 y o  female MRN: [id number]    [facility] Encounter: [id number]    Admission Date:   Admission Orders (From admission, onward)     Ordered        07/16/20 1413  Inpatient Admission  Once         07/15/20 1622  Place in Observation  Once                     Admitting Diagnosis: Multiple rib fractures [S22 49XA]  Unspecified multiple injuries, initial encounter [T07  XXXA]    HPI: Per Dr Sr Search on admission "Shubham Palumbo is a 79 y o  female who presents with From her family doctor after having been seen for left chest wall pain secondary to a Fall on Sunday at home  During the fall, she denies loss of consciousness and states she tripped getting into bed and did bump her head  She is fully alert and oriented  She was prompted to seek care in the emergency department , where fractures of her left 6th through tenth ribs were identified radiographically  She notes only chest wall pain that varies with inspiration  She has no other complaints at this time and is conversant and lucid   "    Procedures Performed: No orders of the defined types were placed in this encounter  Summary of Hospital Course: Mrs Darrin Khan was treated for multiple left sided rib fractures  Her pain was controlled with tylenol and PRN oxycodone  She remains on room air without any hypoxia and is getting 500-750 on IS  She was provided a flutter valve also which she prefers  Her and her daughter were made aware to continue pulmonary toilet exercises for the next 6 weeks to mitigate risk of pneumonia  She was found to have a UTI present on admission and received IV rocephin while inpatient  She will complete a 7 day course of antibiotics, keflex PO, and follow-up with her PCP  Incidental uterine nodule was also discussed with patient and her daughter Harvinder Ardon at bedside and recommendation for outpatient follow-up with gynecology   She will have follow-up in 2 weeks with the trauma clinic but was instructed to call sooner with any questions or concerns  Significant Findings, Care, Treatment and Services Provided: Xr Ribs Left W Pa Chest Min 3 Views    Result Date: 7/15/2020  Impression: Acute fractures involving the left lateral 6th through 10th ribs, as described above  No definite pneumothorax identified on the current exam  Workstation performed: [id number]     Ct Head Without Contrast    Result Date: 7/15/2020  Impression: 1  No acute intracranial abnormality  Microangiopathic changes  2   Small right anterior scalp hematoma  Workstation performed: [id number]     Ct Chest Abdomen Pelvis W Contrast    Result Date: 7/15/2020  Impression: 1  Acute fractures of 6th through 10th left anterior lateral ribs with mild displacement of 7th and 8th fractures  No pneumothorax  2   No visceral injury within the chest, abdomen, pelvis  3   4 9 x 3 6 cm enhancing nodule within the right aspect of the lower uterine segment  Further gynecologic workup is recommended  4   Bladder wall submucosal hyperenhancement with mild perivesicular inflammatory change, which may indicate cystitis  Correlation with urinalysis recommended  The study was marked in [facility] for immediate notification  Workstation performed: [id number]       Complications: none    Discharge Diagnosis:   Patient Active Problem List   Diagnosis    [facility]ran[facility] [address] [facility]    Cerebrovascular accident ([facility] [address] )    Closed fracture of multiple ribs of left side    UTI (urinary tract infection)    Uterine mass         Resolved Problems  Date Reviewed: 7/17/2020    None          Condition at Discharge: stable         Discharge instructions/Information to patient and family:   See after visit summary for information provided to patient and family  Provisions for Follow-Up Care:  See after visit summary for information related to follow-up care and any pertinent home health orders        PCP: GEORGIA Levi    Disposition: Home    Planned Readmission: No      Discharge Statement   I spent 26 minutes discharging the patient  This time was spent on the day of discharge  I had direct contact with the patient on the day of discharge  Additional documentation is required if more than 30 minutes were spent on discharge  Discharge Medications:  See after visit summary for reconciled discharge medications provided to patient and family

## 2020-07-17 NOTE — PROGRESS NOTES
Consultation - Acute Pain Service   Ana Anders 79 y o  female MRN: [id number]  Unit/Bed#: [facility] 823-01 Encounter: [id number]               Assessment/Plan     Assessment:   Patient Active Problem List   Diagnosis    Supranuclear palsy ([address] )    Cerebrovascular accident ([facility] [address] )    Closed fracture of multiple ribs of left side      80 yo woman S/P fall, with left sided ribs 6-10  She has done well overnight, and appears comfortable  She has only needed one dose of oxycodone in the last 24 hours  Her effort on IS has increased up to > 1250  Plan:   The patient is doing very well  There is no indication for an epidural at this time  Continue using prn medications and encouraging IS use  APS sign off  Thank you for the Consult  Please contact APS ( btwn [phone]) with any further questions    History of Present Illness    Admit Date:  7/15/2020  Hospital Day:  1 day  Primary Service:  Trauma  Attending Provider:  Leighton Garcias MD  Reason for Consult / Principal Problem:   HPI: Ana Anders is a 79y o  year old female who presents with rib fractures    Current pain location(s): left ribs  Pain Scale:   2/10  Quality: sharp with inspration  Current Analgesic regimen:  2 5-5mg oxycodone    Pain History: new acute pain after fall  Pain Management Provider:  none    I have reviewed the patient's controlled substance dispensing history in the Prescription Drug Monitoring Program in compliance with the [facility] regulations before prescribing any controlled substances  Consults  Review of Systems Unable to obtain from patient      Historical Information   Past Medical History:   Diagnosis Date    Supranuclear palsy ([facility] [address] )      Past Surgical History:   Procedure Laterality Date    BACK SURGERY      HIP FRACTURE SURGERY       Social History   Social History     Substance and Sexual Activity   Alcohol Use Not Currently    Frequency: Never     Social History     Substance and Sexual Activity   Drug Use Never     Social History     Tobacco Use   Smoking Status Never Smoker   Smokeless Tobacco Never Used     Family History: non-contributory    Meds/Allergies   current meds:   Current Facility-Administered Medications   Medication Dose Route Frequency    acetaminophen (TYLENOL) tablet 975 mg  975 mg Oral Q8H [address]    aspirin chewable tablet 81 mg  81 mg Oral Daily    cefTRIAXone (ROCEPHIN) 1,000 mg in dextrose 5 % 50 mL IVPB  1,000 mg Intravenous Q24H    enoxaparin (LOVENOX) subcutaneous injection 30 mg  30 mg Subcutaneous BID    lidocaine (LIDODERM) 5 % patch 1 patch  1 patch Topical Daily    oxybutynin (DITROPAN-XL) 24 hr tablet 10 mg  10 mg Oral HS    oxyCODONE (ROXICODONE) IR tablet 2 5 mg  2 5 mg Oral Q4H PRN    oxyCODONE (ROXICODONE) IR tablet 5 mg  5 mg Oral Q4H PRN    senna-docusate sodium (SENOKOT S) 8 6-50 mg per tablet 1 tablet  1 tablet Oral Daily       Allergies   Allergen Reactions    Procaine Anaphylaxis and Other (See Comments)     Got a really sore mouth, long ago  Got a really sore mouth, long ago      Penicillins Other (See Comments)    Sulfa Antibiotics     Sulfacetamide        Objective   Temp:  [97 7 °F (36 5 °C)-98 1 °F (36 7 °C)] 97 7 °F (36 5 °C)  HR:  [59-93] 70  Resp:  [16-22] 16  BP: (110-141)/(70-79) 138/79    Intake/Output Summary (Last 24 hours) at 7/17/2020 1014  Last data filed at 7/17/2020 0900  Gross per 24 hour   Intake 300 ml   Output 100 ml   Net 200 ml       Physical Exam   Constitutional: She is oriented to person, place, and time  She appears well-developed and well-nourished  HENT:   Head: Normocephalic  Eyes: Pupils are equal, round, and reactive to light  Cardiovascular: Normal rate, regular rhythm and normal heart sounds  Pulmonary/Chest:   Decreased respiratory effort  Mild splinting  Abdominal: Bowel sounds are normal    Neurological: She is alert and oriented to person, place, and time  Psychiatric: She has a normal mood and affect   Her behavior is normal        Lab Results: I have personally reviewed pertinent labs  Imaging Studies: I have personally reviewed pertinent reports  EKG, Pathology, and Other Studies: I have personally reviewed pertinent reports  Counseling / Coordination of Care  Total floor / unit time spent today Level 2 = 40 minutes  Greater than 50% of total time was spent with the patient and / or family counseling and / or coordination of care      Sebastien Pedersen MD

## 2020-07-18 LAB — BACTERIA UR CULT: ABNORMAL

## 2020-07-20 ENCOUNTER — TRANSITIONAL CARE MANAGEMENT (OUTPATIENT)
Dept: FAMILY MEDICINE CLINIC | Facility: CLINIC | Age: 80
End: 2020-07-20

## 2020-07-21 NOTE — PHYSICIAN ADVISOR
Current patient class: Inpatient  The patient is currently on Hospital Day: 3 at [address] [place] [address]      This patient was originally admitted to the hospital under observation status  After admission the patient was reevaluated and determined to require further hospitalization  The patient is now documented to require at least a 2nd midnight in the hospital  As such the patient is now expected to satisfy the 2 midnight benchmark and is therefore eligible for inpatient admission  After review of the relevant documentation, labs, vital signs and test results, the patient is appropriate for INPATIENT ADMISSION  Admission to the hospital as an inpatient is a complex decision making process which requires the practitioner to consider the patients presenting complaint, history and physical examination and all relevant testing  With this in mind, in this case, the patient was deemed appropriate for INPATIENT ADMISSION  After review of the documentation and testing available at the time of the admission I concur with this clinical determination of medical necessity  Rationale is as follows: The patient was admitted on July 17, 2020 after a fall  She sustained left rib fractures (6-10) and was admitted under observation class to the trauma team   She was found to have a urinary tract infection and was treated with intravenous Rocephin  The patient was changed to inpatient class after the first midnight  She underwent consultation by geriatric medicine and the acute pain service  She also underwent physical therapy assessment/ treatment  The patient was felt to require a second midnight in the hospital given her acute fractures  Her medical history is complicated by supranuclear palsy  At the time of geriatric assessment, a diagnosis of acute metabolic encephalopathy was made    The patient was described as confused, restless, unable to fully participate likely multifactorial due to traumatic injury, acute pain, hospitalization  It was noted that the patient is an extremely high fall risk and a second midnight in the hospital was appropriate for management and safe discharge planning  The patients vitals on arrival were ED Triage Vitals [07/15/20 1218]   Temperature Pulse Respirations Blood Pressure SpO2   98 2 °F (36 8 °C) 82 18 159/88 95 %      Temp Source Heart Rate Source Patient Position - Orthostatic VS BP Location FiO2 (%)   Oral Monitor Lying Right arm --      Pain Score       9           Past Medical History:   Diagnosis Date    Supranuclear palsy ([address] )      Past Surgical History:   Procedure Laterality Date    BACK SURGERY      HIP FRACTURE SURGERY         The patient was admitted to the hospital at 1413 on 7/16/20 for the following diagnosis:  Multiple rib fractures [S22 49XA]  Unspecified multiple injuries, initial encounter [T07  XXXA]    Consults have been placed to:   IP CONSULT TO CASE MANAGEMENT  IP CONSULT TO ACUTE PAIN SERVICE  IP CONSULT TO GERONTOLOGY    Vitals:    07/16/20 1515 07/16/20 1543 07/17/20 0132 07/17/20 0743   BP: 141/79  110/70 138/79   BP Location: Left arm      Pulse: 93  59 70   Resp: 22  20 16   Temp: 97 7 °F (36 5 °C)  98 1 °F (36 7 °C) 97 7 °F (36 5 °C)   TempSrc: Oral      SpO2: 91%  96% 95%   Weight:  75 8 kg (167 lb 1 7 oz)     Height:  5' 5" (1 651 m)         Most recent labs:    No results for input(s): WBC, HGB, HCT, PLT, K, NA, CALCIUM, BUN, CREATININE, LIPASE, AMYLASE, INR, TROPONINI, CKTOTAL, AST, ALT, ALKPHOS, BILITOT in the last 72 hours  Scheduled Meds:  Continuous Infusions:  No current facility-administered medications for this encounter  PRN Meds:      Surgical procedures (if appropriate):

## 2020-07-23 ENCOUNTER — OFFICE VISIT (OUTPATIENT)
Dept: FAMILY MEDICINE CLINIC | Facility: CLINIC | Age: 80
End: 2020-07-23
Payer: MEDICARE

## 2020-07-23 VITALS
HEART RATE: 77 BPM | RESPIRATION RATE: 17 BRPM | DIASTOLIC BLOOD PRESSURE: 76 MMHG | OXYGEN SATURATION: 98 % | TEMPERATURE: 99.3 F | SYSTOLIC BLOOD PRESSURE: 138 MMHG

## 2020-07-23 DIAGNOSIS — N85.8 UTERINE MASS: ICD-10-CM

## 2020-07-23 DIAGNOSIS — S22.42XA CLOSED FRACTURE OF MULTIPLE RIBS OF LEFT SIDE, INITIAL ENCOUNTER: Primary | ICD-10-CM

## 2020-07-23 DIAGNOSIS — G23.1 SUPRANUCLEAR PALSY (HCC): ICD-10-CM

## 2020-07-23 DIAGNOSIS — N30.00 ACUTE CYSTITIS WITHOUT HEMATURIA: ICD-10-CM

## 2020-07-23 PROCEDURE — 99496 TRANSJ CARE MGMT HIGH F2F 7D: CPT | Performed by: NURSE PRACTITIONER

## 2020-07-23 NOTE — PATIENT INSTRUCTIONS
Follow up with gynecology for uterine nodule  pulmonary  exercises for the next 6 weeks to mitigate risk of pneumonia  Atelectasis   WHAT YOU NEED TO KNOW:   Atelectasis happens when the alveoli in your lungs cannot expand fully  This may cause part or all of your lung to collapse  The exchange of oxygen and carbon dioxide cannot take place in the alveoli, when your lung collapses  Atelectasis happens very often after surgery  Atelectasis may last for days  It may be caused by not being able to take a deep breath due to blocked airways or surgery  It may also be due to disease, infection, or trauma  Heart Healthy Diet   WHAT YOU NEED TO KNOW:   A heart healthy diet is an eating plan low in total fat, unhealthy fats, and sodium (salt)  A heart healthy diet helps decrease your risk for heart disease and stroke  Limit the amount of fat you eat to 25% to 35% of your total daily calories  Limit sodium to less than 2,300 mg each day  DISCHARGE INSTRUCTIONS:   Healthy fats:  Healthy fats can help improve cholesterol levels  The risk for heart disease is decreased when cholesterol levels are normal  Choose healthy fats, such as the following:  · Unsaturated fat  is found in foods such as soybean, canola, olive, corn, and safflower oils  It is also found in soft tub margarine that is made with liquid vegetable oil  · Omega-3 fat  is found in certain fish, such as salmon, tuna, and trout, and in walnuts and flaxseed  Unhealthy fats:  Unhealthy fats can cause unhealthy cholesterol levels in your blood and increase your risk of heart disease  Limit unhealthy fats, such as the following:  · Cholesterol  is found in animal foods, such as eggs and lobster, and in dairy products made from whole milk  Limit cholesterol to less than 300 milligrams (mg) each day  You may need to limit cholesterol to 200 mg each day if you have heart disease  · Saturated fat  is found in meats, such as lees and hamburger   It is also found in chicken or turkey skin, whole milk, and butter  Limit saturated fat to less than 7% of your total daily calories  Limit saturated fat to less than 6% if you have heart disease or are at increased risk for it  · Trans fat  is found in packaged foods, such as potato chips and cookies  It is also in hard margarine, some fried foods, and shortening  Avoid trans fats as much as possible    Heart healthy foods and drinks to include:  Ask your dietitian or healthcare provider how many servings to have from each of the following food groups:  · Grains:      ¨ Whole-wheat breads, cereals, and pastas, and brown rice    ¨ Low-fat, low-sodium crackers and chips    · Vegetables:      ¨ Broccoli, green beans, green peas, and spinach    ¨ Collards, kale, and lima beans    ¨ Carrots, sweet potatoes, tomatoes, and peppers    ¨ Canned vegetables with no salt added    · Fruits:      ¨ Bananas, peaches, pears, and pineapple    ¨ Grapes, raisins, and dates    ¨ Oranges, tangerines, grapefruit, orange juice, and grapefruit juice    ¨ Apricots, mangoes, melons, and papaya    ¨ Raspberries and strawberries    ¨ Canned fruit with no added sugar    · Low-fat dairy products:      ¨ Nonfat (skim) milk, 1% milk, and low-fat almond, cashew, or soy milks fortified with calcium    ¨ Low-fat cheese, regular or frozen yogurt, and cottage cheese    · Meats and proteins , such as lean cuts of beef and pork (loin, leg, round), skinless chicken and turkey, legumes, soy products, egg whites, and nuts  Foods and drinks to limit or avoid:  Ask your dietitian or healthcare provider about these and other foods that are high in unhealthy fat, sodium, and sugar:  · Snack or packaged foods , such as frozen dinners, cookies, macaroni and cheese, and cereals with more than 300 mg of sodium per serving    · Canned or dry mixes  for cakes, soups, sauces, or gravies    · Vegetables with added sodium , such as instant potatoes, vegetables with added sauces, or regular canned vegetables    · Other foods high in sodium , such as ketchup, barbecue sauce, salad dressing, pickles, olives, soy sauce, and miso    · High-fat dairy foods  such as whole or 2% milk, cream cheese, or sour cream, and cheeses     · High-fat protein foods  such as high-fat cuts of beef (T-bone steaks, ribs), chicken or turkey with skin, and organ meats, such as liver    · Cured or smoked meats , such as hot dogs, lees, and sausage    · Unhealthy fats and oils , such as butter, stick margarine, shortening, and cooking oils such as coconut or palm oil    · Food and drinks high in sugar , such as soft drinks (soda), sports drinks, sweetened tea, candy, cake, cookies, pies, and doughnuts  Other diet guidelines to follow:   · Eat more foods containing omega-3 fats  Eat fish high in omega-3 fats at least 2 times a week  · Limit alcohol  Too much alcohol can damage your heart and raise your blood pressure  Women should limit alcohol to 1 drink a day  Men should limit alcohol to 2 drinks a day  A drink of alcohol is 12 ounces of beer, 5 ounces of wine, or 1½ ounces of liquor  · Choose low-sodium foods  High-sodium foods can lead to high blood pressure  Add little or no salt to food you prepare  Use herbs and spices in place of salt  · Eat more fiber  to help lower cholesterol levels  Eat at least 5 servings of fruits and vegetables each day  Eat 3 ounces of whole-grain foods each day  Legumes (beans) are also a good source of fiber  Lifestyle guidelines:   · Do not smoke  Nicotine and other chemicals in cigarettes and cigars can cause lung and heart damage  Ask your healthcare provider for information if you currently smoke and need help to quit  E-cigarettes or smokeless tobacco still contain nicotine  Talk to your healthcare provider before you use these products  · Exercise regularly  to help you maintain a healthy weight and improve your blood pressure and cholesterol levels  Ask your healthcare provider about the best exercise plan for you  Do not start an exercise program without asking your healthcare provider  Follow up with your healthcare provider as directed:  Write down your questions so you remember to ask them during your visits  © 2017 [employer] Information is for End User's use only and may not be sold, redistributed or otherwise used for commercial purposes  All illustrations and images included in CareNotes® are the copyrighted property of [employer]  or Andrea Singh  The above information is an  only  It is not intended as medical advice for individual conditions or treatments  Talk to your doctor, nurse or pharmacist before following any medical regimen to see if it is safe and effective for you

## 2020-07-23 NOTE — PROGRESS NOTES
TCM Call (since 2020)     Date and time call was made  2020 10:26 AM      TCM Call (since 2020)     Scheduled for follow up? Not Clinically Warranted    Not clinically warranted  patient is currently being transfered to [facility], was admitted to [facility] on 20 and 20          [facility]        NAME: Pedro Ballard is a 79 y o  female  : 1940    MRN: [id number]  DATE: 2020  TIME: 3:58 PM    Assessment and Plan   Closed fracture of multiple ribs of left side, initial encounter [S22 42XA]  1  Closed fracture of multiple ribs of left side, initial encounter     2  Uterine mass  Ambulatory referral to Gynecology   3  Acute cystitis without hematuria     4  Supranuclear palsy ([facility] [address] )       BMI Counseling: There is no height or weight on file to calculate BMI  The BMI is above normal  Nutrition recommendations include decreasing portion sizes, encouraging healthy choices of fruits and vegetables, decreasing fast food intake, consuming healthier snacks, moderation in carbohydrate intake, increasing intake of lean protein, reducing intake of saturated and trans fat and reducing intake of cholesterol  Discussed use of IS with daughter and that this should be done throughout the day, every day for 6 weeks   Pneumonia S/S discussed with     Patient Instructions     Patient Instructions    Follow up with gynecology for uterine nodule  pulmonary  exercises for the next 6 weeks to mitigate risk of pneumonia  Atelectasis   WHAT YOU NEED TO KNOW:   Atelectasis happens when the alveoli in your lungs cannot expand fully  This may cause part or all of your lung to collapse  The exchange of oxygen and carbon dioxide cannot take place in the alveoli, when your lung collapses  Atelectasis happens very often after surgery  Atelectasis may last for days  It may be caused by not being able to take a deep breath due to blocked airways or surgery   It may also be due to disease, infection, or trauma  Heart Healthy Diet   WHAT YOU NEED TO KNOW:   A heart healthy diet is an eating plan low in total fat, unhealthy fats, and sodium (salt)  A heart healthy diet helps decrease your risk for heart disease and stroke  Limit the amount of fat you eat to 25% to 35% of your total daily calories  Limit sodium to less than 2,300 mg each day  DISCHARGE INSTRUCTIONS:   Healthy fats:  Healthy fats can help improve cholesterol levels  The risk for heart disease is decreased when cholesterol levels are normal  Choose healthy fats, such as the following:  · Unsaturated fat  is found in foods such as soybean, canola, olive, corn, and safflower oils  It is also found in soft tub margarine that is made with liquid vegetable oil  · Omega-3 fat  is found in certain fish, such as salmon, tuna, and trout, and in walnuts and flaxseed  Unhealthy fats:  Unhealthy fats can cause unhealthy cholesterol levels in your blood and increase your risk of heart disease  Limit unhealthy fats, such as the following:  · Cholesterol  is found in animal foods, such as eggs and lobster, and in dairy products made from whole milk  Limit cholesterol to less than 300 milligrams (mg) each day  You may need to limit cholesterol to 200 mg each day if you have heart disease  · Saturated fat  is found in meats, such as lees and hamburger  It is also found in chicken or turkey skin, whole milk, and butter  Limit saturated fat to less than 7% of your total daily calories  Limit saturated fat to less than 6% if you have heart disease or are at increased risk for it  · Trans fat  is found in packaged foods, such as potato chips and cookies  It is also in hard margarine, some fried foods, and shortening  Avoid trans fats as much as possible    Heart healthy foods and drinks to include:  Ask your dietitian or healthcare provider how many servings to have from each of the following food groups:  · Grains: ¨ Whole-wheat breads, cereals, and pastas, and brown rice    ¨ Low-fat, low-sodium crackers and chips    · Vegetables:      ¨ Broccoli, green beans, green peas, and spinach    ¨ Collards, kale, and lima beans    ¨ Carrots, sweet potatoes, tomatoes, and peppers    ¨ Canned vegetables with no salt added    · Fruits:      ¨ Bananas, peaches, pears, and pineapple    ¨ Grapes, raisins, and dates    ¨ Oranges, tangerines, grapefruit, orange juice, and grapefruit juice    ¨ Apricots, mangoes, melons, and papaya    ¨ Raspberries and strawberries    ¨ Canned fruit with no added sugar    · Low-fat dairy products:      ¨ Nonfat (skim) milk, 1% milk, and low-fat almond, cashew, or soy milks fortified with calcium    ¨ Low-fat cheese, regular or frozen yogurt, and cottage cheese    · Meats and proteins , such as lean cuts of beef and pork (loin, leg, round), skinless chicken and turkey, legumes, soy products, egg whites, and nuts  Foods and drinks to limit or avoid:  Ask your dietitian or healthcare provider about these and other foods that are high in unhealthy fat, sodium, and sugar:  · Snack or packaged foods , such as frozen dinners, cookies, macaroni and cheese, and cereals with more than 300 mg of sodium per serving    · Canned or dry mixes  for cakes, soups, sauces, or gravies    · Vegetables with added sodium , such as instant potatoes, vegetables with added sauces, or regular canned vegetables    · Other foods high in sodium , such as ketchup, barbecue sauce, salad dressing, pickles, olives, soy sauce, and miso    · High-fat dairy foods  such as whole or 2% milk, cream cheese, or sour cream, and cheeses     · High-fat protein foods  such as high-fat cuts of beef (T-bone steaks, ribs), chicken or turkey with skin, and organ meats, such as liver    · Cured or smoked meats , such as hot dogs, lees, and sausage    · Unhealthy fats and oils , such as butter, stick margarine, shortening, and cooking oils such as coconut or palm oil    · Food and drinks high in sugar , such as soft drinks (soda), sports drinks, sweetened tea, candy, cake, cookies, pies, and doughnuts  Other diet guidelines to follow:   · Eat more foods containing omega-3 fats  Eat fish high in omega-3 fats at least 2 times a week  · Limit alcohol  Too much alcohol can damage your heart and raise your blood pressure  Women should limit alcohol to 1 drink a day  Men should limit alcohol to 2 drinks a day  A drink of alcohol is 12 ounces of beer, 5 ounces of wine, or 1½ ounces of liquor  · Choose low-sodium foods  High-sodium foods can lead to high blood pressure  Add little or no salt to food you prepare  Use herbs and spices in place of salt  · Eat more fiber  to help lower cholesterol levels  Eat at least 5 servings of fruits and vegetables each day  Eat 3 ounces of whole-grain foods each day  Legumes (beans) are also a good source of fiber  Lifestyle guidelines:   · Do not smoke  Nicotine and other chemicals in cigarettes and cigars can cause lung and heart damage  Ask your healthcare provider for information if you currently smoke and need help to quit  E-cigarettes or smokeless tobacco still contain nicotine  Talk to your healthcare provider before you use these products  · Exercise regularly  to help you maintain a healthy weight and improve your blood pressure and cholesterol levels  Ask your healthcare provider about the best exercise plan for you  Do not start an exercise program without asking your healthcare provider  Follow up with your healthcare provider as directed:  Write down your questions so you remember to ask them during your visits  © 2017 [employer]  Information is for End User's use only and may not be sold, redistributed or otherwise used for commercial purposes  All illustrations and images included in CareNotes® are the copyrighted property of [employer]  or Andrea Singh    The above information is an  only  It is not intended as medical advice for individual conditions or treatments  Talk to your doctor, nurse or pharmacist before following any medical regimen to see if it is safe and effective for you  Chief Complaint     Chief Complaint   Patient presents with    Transition of Care Management         History of Present Illness       Patient here for hospital follow up visit  Was admitted to [address] [place] [address] from 7/15/20-7/17/20 with rib fractures,incidentally found a uterine nodule  Per hospital D/C instructions: Incidental uterine nodule was also discussed with patient and her daughter Adarsh Cruz at bedside and recommendation for outpatient follow-up with gynecology  She will have follow-up in 2 weeks with the trauma clinic but was instructed to call sooner with any questions or concerns  On 7/18/20 had episode of confusion, inability to talk and flaccid appearing on presentation to the [facility] Emergency department  Was admitted for 1 days  Was found to have atelectasis vs Pneumonia on CT chest  Urine was negative for UTI at this time  Was d/c'd on the 19th Then when  was driving her home he noticed she was unconscious and brought her back to the [facility] emergency department with no specific findings  Daughter inquiring about a maintenince antibiotic for her mother, reports this was dissused with Ashley at a previous appointment  Gets cloudy urine, confusion  UTI every month, seems to happen after completion of antibiotic course  Patient continues to be wheelchair bound and does not talk often, drooling occasionally throughout visit  Reports she got THC drops to help treat the Supranuclear palsy  Has not start this yet due to everything that has been going on in the last 2 weeks  Will make us away if and when she does start this         Review of Systems   Review of Systems   Constitutional: Negative for activity change, appetite change, chills, fatigue and fever  HENT: Negative for congestion, ear pain, nosebleeds, rhinorrhea and sore throat  Eyes: Negative for photophobia, pain, redness and visual disturbance  Respiratory: Negative for cough, shortness of breath and wheezing  Cardiovascular: Negative  Negative for chest pain  Gastrointestinal: Negative  Negative for abdominal pain, constipation, diarrhea and vomiting  Endocrine: Negative  Genitourinary: Negative for difficulty urinating, dysuria and flank pain  Musculoskeletal: Negative  Skin: Negative for color change and rash  Neurological: Negative for dizziness, weakness, numbness and headaches  Hematological: Negative for adenopathy  Psychiatric/Behavioral: Negative for agitation and confusion  The patient is not nervous/anxious          PHQ-9 Depression Screening    PHQ-9:    Frequency of the following problems over the past two weeks:       Little interest or pleasure in doing things:  0 - not at all  Feeling down, depressed, or hopeless:  0 - not at all  PHQ-2 Score:  0        Current Medications       Current Outpatient Medications:     acetaminophen (TYLENOL) 325 mg tablet, Take 2 tablets (650 mg total) by mouth every 6 (six) hours as needed for mild pain, moderate pain or headaches, Disp: 30 tablet, Rfl: 0    aspirin 81 mg chewable tablet, Chew 81 mg daily, Disp: , Rfl:     oxybutynin (DITROPAN-XL) 10 MG 24 hr tablet, Take 1 tablet (10 mg total) by mouth daily at bedtime, Disp: 90 tablet, Rfl: 1    oxyCODONE (ROXICODONE) 5 mg immediate release tablet, Take 1 tablet (5 mg total) by mouth every 4 (four) hours as needed for severe painMax Daily Amount: 30 mg, Disp: 20 tablet, Rfl: 0    senna-docusate sodium (SENOKOT-S) 8 6-50 mg per tablet, Take 1 tablet by mouth daily, Disp: 90 tablet, Rfl: 1    Current Allergies     Allergies as of 07/23/2020 - Reviewed 07/23/2020   Allergen Reaction Noted    Procaine Anaphylaxis and Other (See Comments) 10/19/2011    Penicillins Other (See Comments) 11/02/2007    Sulfa antibiotics Other (See Comments) 11/07/2016    Sulfacetamide  11/02/2007            The following portions of the patient's history were reviewed and updated as appropriate: allergies, current medications, past family history, past medical history, past social history, past surgical history and problem list      Past Medical History:   Diagnosis Date    Supranuclear palsy ([address] )        Past Surgical History:   Procedure Laterality Date    BACK SURGERY      HIP FRACTURE SURGERY         Family History   Family history unknown: Yes         Medications have been verified  Objective   /76   Pulse 77   Temp 99 3 °F (37 4 °C)   Resp 17   SpO2 98%        Physical Exam     Physical Exam   Constitutional: She is oriented to person, place, and time  She appears well-developed and well-nourished  She is cooperative  She does not appear ill  No distress  Eyes: Lids are normal    Cardiovascular: Normal rate, regular rhythm, S1 normal, S2 normal, normal heart sounds and intact distal pulses  Exam reveals no gallop and no friction rub  No murmur heard  Pulmonary/Chest: Effort normal and breath sounds normal  No respiratory distress  She has no decreased breath sounds  She has no wheezes  Abdominal: Normal appearance  Musculoskeletal: Normal range of motion  She exhibits no edema, tenderness or deformity  Neurological: She is alert and oriented to person, place, and time  Skin: Skin is warm  No rash noted  No erythema  Psychiatric: She has a normal mood and affect  Her behavior is normal  Thought content normal    Nursing note and vitals reviewed

## 2020-08-06 ENCOUNTER — TELEPHONE (OUTPATIENT)
Dept: FAMILY MEDICINE CLINIC | Facility: CLINIC | Age: 80
End: 2020-08-06

## 2020-08-06 NOTE — TELEPHONE ENCOUNTER
Raphael Farrar called and stated that she never received a call from the pharmacy about her mothers wheelchair      Do you have any information on this matter and what pharmacy    Please advise    21 [phone]

## 2020-08-10 NOTE — TELEPHONE ENCOUNTER
Made patients daughter aware that wheelchair order was faxed to [facility] and that she should contact the supply company

## 2020-08-21 ENCOUNTER — TELEPHONE (OUTPATIENT)
Dept: FAMILY MEDICINE CLINIC | Facility: CLINIC | Age: 80
End: 2020-08-21

## 2020-08-21 DIAGNOSIS — G23.1 SUPRANUCLEAR PALSY (HCC): Primary | ICD-10-CM

## 2020-08-21 DIAGNOSIS — I63.89 CEREBROVASCULAR ACCIDENT (CVA) DUE TO OTHER MECHANISM (HCC): ICD-10-CM

## 2020-08-21 DIAGNOSIS — R26.9 GAIT DISTURBANCE: ICD-10-CM

## 2020-08-21 NOTE — TELEPHONE ENCOUNTER
Ernestina called from [facility] was servicing patient for therapy was in hospital was going to resume care once discharged  Radha Tilley  needed to get in contract with the patients daughters to resume care but the one daughter was on vacation so they called the other daughter they could not get in contact with the other daughter so  by the time they got in contact the therapy order   Can we place order for patient?

## 2020-08-28 ENCOUNTER — TELEPHONE (OUTPATIENT)
Dept: FAMILY MEDICINE CLINIC | Facility: CLINIC | Age: 80
End: 2020-08-28

## 2020-08-28 NOTE — TELEPHONE ENCOUNTER
Jennifer Tan, a PT from [employer] called and stated that Dakotah Yo called him and stated that [facility]  took her back to [facility] and that he will be taking care of her    She was asking that the PT not be DC, because she feels he will be bring her back to the [facility] house      So PT will not DC for 2 weeks after that they will DC her from PT    Please advise    Phone:  Jennifer Tan [phone]

## 2020-10-21 DIAGNOSIS — R39.9 UTI SYMPTOMS: Primary | ICD-10-CM

## 2020-10-21 RX ORDER — CEPHALEXIN 500 MG/1
500 CAPSULE ORAL EVERY 6 HOURS SCHEDULED
Qty: 40 CAPSULE | Refills: 0 | Status: CANCELLED | OUTPATIENT
Start: 2020-10-21 | End: 2020-10-31

## 2020-10-21 RX ORDER — CEPHALEXIN 500 MG/1
500 CAPSULE ORAL 3 TIMES DAILY
Qty: 30 CAPSULE | Refills: 0 | Status: CANCELLED | OUTPATIENT
Start: 2020-10-21 | End: 2020-10-31

## 2020-10-21 RX ORDER — CEPHALEXIN 500 MG/1
500 CAPSULE ORAL 3 TIMES DAILY
Qty: 30 CAPSULE | Refills: 0 | Status: SHIPPED | OUTPATIENT
Start: 2020-10-21 | End: 2020-10-31

## 2023-05-08 NOTE — OCCUPATIONAL THERAPY NOTE
Increase venlafaxine extended release to 150 mg daily   Occupational Therapy Evaluation     Patient Name: Ray DICKSON Date: 7/16/2020  Problem List  Principal Problem:    Closed fracture of multiple ribs of left side  Active Problems:    Supranuclear palsy [facility])    Past Medical History  Past Medical History:   Diagnosis Date    Supranuclear palsy [facility])      Past Surgical History  Past Surgical History:   Procedure Laterality Date    BACK SURGERY      HIP FRACTURE SURGERY        07/16/20 1143   Note Type   Note type Eval only   Restrictions/Precautions   Weight Bearing Precautions Per Order No   Other Precautions Cognitive; Chair Alarm;Pain; Fall Risk  (Chair alram on at end of session)   Pain Assessment   Pain Assessment Tool 0-10   Pain Score 5   Pain Location/Orientation Orientation: Left; Location: Rib Cage   Hospital Pain Intervention(s) Repositioned; Ambulation/increased activity; Emotional support   Home Living   Type of [address] Other (Comment)  (pt poor historian, need to clarify  However pt lives w/ fam)   Home Equipment Walker; Wheelchair-manual   Additional Comments pt poor historian, need to clarify home set up with family  However, pt lives at home with family   Prior Function   Level of [place] Needs assistance with IADLs; Needs assistance with ADLs and functional mobility   Lives With Spouse;Daughter   Receives Help From Family   ADL Assistance Needs assistance   IADLs Needs assistance   Falls in the last 6 months 1 to 4   Comments Pt reports receiving help from  and daughter  Pt reports receing home health care and is "working on cooking"    Lifestyle   Autonomy PTA pt reports receiving assistance in her ADLs, IADLs and functional mobility   Reciprocal Relationships  and daughter   Service to Others unable to [employer]   Intrinsic Gratification Reading   Psychosocial   Psychosocial (WDL) X   Patient Behaviors/Mood Calm; Cooperative   Subjective   Subjective "I get help at home"    ADL   Where Assessed Edge of bed Eating Assistance 4  Minimal Assistance   Grooming Assistance 3  Moderate Assistance   UB Bathing Assistance 3  Moderate Assistance   LB Bathing Assistance 2  Maximal Assistance   UB Dressing Assistance 3  Moderate Assistance   LB Dressing Assistance 2  Maximal [address]  2  Maximal Assistance   Bed Mobility   Supine to Sit 3  Moderate assistance   Additional items Assist x 2;Verbal cues; Increased time required;LE management   Transfers   Sit to Stand 4  Minimal assistance   Additional items Assist x 1;Verbal cues   Stand to Sit 4  Minimal assistance   Additional items Assist x 1;Verbal cues; Increased time required   Functional Mobility   Functional Mobility 4  Minimal assistance   Additional items Rolling walker   Balance   Static Sitting Fair -   Dynamic Sitting Poor   Static Standing Poor +   Dynamic Standing Poor +   Ambulatory Poor +   Activity Tolerance   Activity Tolerance Patient limited by fatigue;Patient limited by pain   Medical Staff Made Aware LESA Hahn   Nurse Made Aware SANJEEV drew   RUE Assessment   RUE Assessment WFL   LUE Assessment   LUE Assessment WNL  (limited by pain, able to bring to 90 degrees comfortably)   Hand Function   Gross Motor Coordination Functional   Fine Motor Coordination Functional   Cognition   Overall Cognitive Status Impaired   Arousal/Participation Cooperative   Attention Attends with cues to redirect   Orientation Level Oriented to person;Oriented to place;Oriented to situation;Disoriented to time  (stated name, , "hospital", & why she is here)   Memory Decreased short term memory;Decreased recall of precautions   Following Commands Follows one step commands with increased time or repetition   Comments Pt is a poor historian; baseline cog and speech deficits secondary to supranuclear palsy; responiveness required increased time and was soft spoken   Assessment   Limitation Decreased ADL status; Decreased Safe judgement during ADL;Decreased cognition;Decreased endurance;Decreased self-care trans;Decreased high-level ADLs   Prognosis Fair   Assessment Pt is a 78 y/o F admitted to [facility] on 7/16/20 post fall Sunday, dx of L 6-10 fx  Pt PMH of  Supranuclear Palsy and history of CVA  Active OT orders and OOB orders  Pt with cognitive impairments and speech deficits limited secondary to Supranuclear Palsy  PTA, pt reports receiving help from  and DTR in ADLs and IADLs  Pt reports using RW for short distances, and uses a manual w/c for functional mobility  Pt reports living in a 2 SH with  and DTR  Pt is currently Min A for transfers and functional mobility and required Modx2 for bed mobility with LE management  Pt is currently Mod A for UB ADLs and Max A for LB ADLs  Pt is limited by activity tolerance, pain, endurance and cognitive impairments  These impair baseline function in grooming, bathing, toilet hygiene, dressing and functional mobility  PT educated on safe transfers to use hand rails of chair when sitting down  From OT standpoint, anticipate d/c to home with home therapy pending family availability  If family unable to provide current level of assist, recommend short term rehab  OT will follow 3-5x/wk in the next 10-14 days in order to meet following goals  Goals   Patient Goals to go home   LTG Time Frame 10-14   Plan   Treatment Interventions ADL retraining;Functional transfer training; Endurance training;Cognitive reorientation;Patient/family training;Fine motor coordination activities; Compensatory technique education; Energy conservation; Activityengagement   Goal Expiration Date 07/30/20   OT Frequency 3-5x/wk   Recommendation   OT Discharge Recommendation   (See assement above)   OT - OK to Discharge Yes   Modified Belden Scale   Modified Belden Scale 4     GOALS    1  Pt will complete UB ADL tasks @ Min A assist level with DME PRN to decrease caregiver burden    2   Pt will complete LB ADL tasks @ Min A  assist level with DME PRN to decrease caregiver burden    3  Pt will have G carryover of safety awareness during ADL/IADL task & functional mobility    4  Pt will complete transfers @ S assist level with DME PRN to decrease caregiver burden    5  Pt will complete functional mobility @ S assist level with DME PRN to decrease caregiver burden    6   Pt will participate in formal assessment w/ G attention to assist w/ safe d/c      DONNY Quintero